# Patient Record
Sex: MALE | Race: BLACK OR AFRICAN AMERICAN | Employment: OTHER | URBAN - METROPOLITAN AREA
[De-identification: names, ages, dates, MRNs, and addresses within clinical notes are randomized per-mention and may not be internally consistent; named-entity substitution may affect disease eponyms.]

---

## 2017-06-02 ENCOUNTER — HOSPITAL ENCOUNTER (OUTPATIENT)
Dept: ULTRASOUND IMAGING | Age: 82
Discharge: HOME | End: 2017-06-02
Attending: INTERNAL MEDICINE
Payer: MEDICARE

## 2017-06-02 DIAGNOSIS — R19.7 DIARRHEA: ICD-10-CM

## 2017-06-02 DIAGNOSIS — R93.3 ABNORMAL FINDING ON GI TRACT IMAGING: ICD-10-CM

## 2017-06-02 DIAGNOSIS — R11.2 NAUSEA WITH VOMITING: ICD-10-CM

## 2017-06-02 DIAGNOSIS — R63.4 ABNORMAL WEIGHT LOSS: ICD-10-CM

## 2017-06-02 PROCEDURE — 76700 US EXAM ABDOM COMPLETE: CPT

## 2018-01-20 ENCOUNTER — APPOINTMENT (OUTPATIENT)
Dept: CT IMAGING | Age: 83
End: 2018-01-20
Attending: PHYSICIAN ASSISTANT
Payer: MEDICARE

## 2018-01-20 ENCOUNTER — HOSPITAL ENCOUNTER (EMERGENCY)
Age: 83
Discharge: HOME | End: 2018-01-20
Attending: EMERGENCY MEDICINE
Payer: MEDICARE

## 2018-01-20 VITALS
BODY MASS INDEX: 20.51 KG/M2 | RESPIRATION RATE: 16 BRPM | HEART RATE: 98 BPM | DIASTOLIC BLOOD PRESSURE: 87 MMHG | WEIGHT: 155.42 LBS | TEMPERATURE: 98.4 F | SYSTOLIC BLOOD PRESSURE: 141 MMHG | OXYGEN SATURATION: 99 %

## 2018-01-20 DIAGNOSIS — S01.112A EYEBROW LACERATION, LEFT, INITIAL ENCOUNTER: Primary | ICD-10-CM

## 2018-01-20 DIAGNOSIS — S09.90XA MINOR HEAD INJURY, INITIAL ENCOUNTER: ICD-10-CM

## 2018-01-20 PROCEDURE — 70450 CT HEAD/BRAIN W/O DYE: CPT

## 2018-01-20 PROCEDURE — 99282 EMERGENCY DEPT VISIT SF MDM: CPT

## 2018-01-20 PROCEDURE — 90715 TDAP VACCINE 7 YRS/> IM: CPT | Performed by: PHYSICIAN ASSISTANT

## 2018-01-20 PROCEDURE — 77030031132 HC SUT NYL COVD -A

## 2018-01-20 PROCEDURE — 75810000293 HC SIMP/SUPERF WND  RPR

## 2018-01-20 PROCEDURE — 90471 IMMUNIZATION ADMIN: CPT

## 2018-01-20 PROCEDURE — 74011250636 HC RX REV CODE- 250/636: Performed by: PHYSICIAN ASSISTANT

## 2018-01-20 PROCEDURE — 74011000250 HC RX REV CODE- 250: Performed by: PHYSICIAN ASSISTANT

## 2018-01-20 PROCEDURE — 77030018836 HC SOL IRR NACL ICUM -A

## 2018-01-20 PROCEDURE — 70486 CT MAXILLOFACIAL W/O DYE: CPT

## 2018-01-20 RX ADMIN — TETANUS TOXOID, REDUCED DIPHTHERIA TOXOID AND ACELLULAR PERTUSSIS VACCINE, ADSORBED 0.5 ML: 5; 2.5; 8; 8; 2.5 SUSPENSION INTRAMUSCULAR at 17:56

## 2018-01-20 RX ADMIN — LIDOCAINE HYDROCHLORIDE 50 MG: 10; .005 INJECTION, SOLUTION EPIDURAL; INFILTRATION; INTRACAUDAL; PERINEURAL at 17:57
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
## 2018-01-20 NOTE — DISCHARGE INSTRUCTIONS
Thank you for allowing us to provide you with care today. We hope we addressed all of your concerns and needs. We strive to provide excellent quality care in the Emergency Department. Please rate us as excellent, as anything less than excellent does not meet our expectations. If you feel that you have not received excellent quality care or timely care, please ask to speak to the nurse manager. Please choose us in the future for your continued health care needs. The exam and treatment you received in the Emergency Department were for an urgent problem and are not intended as complete care. It is important that you follow-up with a doctor, nurse practitioner, or  [id number] assistant to: (1) confirm your diagnosis, (2) re-evaluation of changes in your illness and treatment, and (3) for ongoing care. If your symptoms become worse or you do not improve as expected and you are unable to reach your usual health care provider, you should return to the Emergency Department. We are available 24 hours a day. Take this sheet with you when you go to your follow-up visit. If you have any problem arranging the follow-up visit, contact the Emergency Department immediately. Make an appointment with your Primary Care doctor for follow up of this visit. Return to the ER if you are unable to be seen in the time recommended on your discharge instructions.
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
## 2018-01-20 NOTE — ED NOTES
JOHN Landrum has reviewed discharge instructions with the patient. The patient verbalized understanding. Pt discharged with written instructions. No further concerns at this time. Pt given prescriptions and ambulatory to exit with steady gait.

## 2018-01-26 ENCOUNTER — OFFICE VISIT (OUTPATIENT)
Dept: FAMILY MEDICINE CLINIC | Age: 83
End: 2018-01-26

## 2018-01-26 VITALS
WEIGHT: 155.1 LBS | HEART RATE: 65 BPM | HEIGHT: 73 IN | TEMPERATURE: 99.3 F | DIASTOLIC BLOOD PRESSURE: 75 MMHG | BODY MASS INDEX: 20.56 KG/M2 | SYSTOLIC BLOOD PRESSURE: 133 MMHG | OXYGEN SATURATION: 98 % | RESPIRATION RATE: 18 BRPM

## 2018-01-26 DIAGNOSIS — Z48.02 VISIT FOR SUTURE REMOVAL: Primary | ICD-10-CM

## 2018-01-26 NOTE — PROGRESS NOTES
Chief Complaint   Patient presents with   [facility] Other     remove 3  sutures        Danica Kaitlyn  Identified pt with two pt identifiers(name and ). Chief Complaint   Patient presents with   [facility].    Other     remove 3  sutures        1. Have you been to the ER, urgent care clinic since your last visit? Hospitalized since your last visit? NO    2. Have you seen or consulted any other health care providers outside of the [address] [place] [address] since your last visit? Include any pap smears or colon screening. NO    Today's provider has been notified of reason for visit, vitals and flowsheets obtained on patients. Patient received paperwork for advance directive during previous visit but has not completed at this time     Reviewed record In preparation for visit, huddled with provider and have obtained necessary documentation      Health Maintenance Due   Topic    ZOSTER VACCINE AGE 60>     GLAUCOMA SCREENING Q2Y     Pneumococcal 65+ Low/Medium Risk (1 of 2 - PCV13)    MEDICARE YEARLY EXAM     Influenza Age 5 to Adult        Wt Readings from Last 3 Encounters:   18 155 lb 1.6 oz (70.4 kg)   18 155 lb 6.8 oz (70.5 kg)   16 147 lb 14.9 oz (67.1 kg)     Temp Readings from Last 3 Encounters:   18 99.3 °F (37.4 °C) (Oral)   18 98.4 °F (36.9 °C)   16 97.9 °F (36.6 °C)     BP Readings from Last 3 Encounters:   18 133/75   18 141/87   16 116/77     Pulse Readings from Last 3 Encounters:   18 65   18 98   16 (!) 102     Vitals:    18 1531   BP: 133/75   Pulse: 65   Resp: 18   Temp: 99.3 °F (37.4 °C)   TempSrc: Oral   SpO2: 98%   Weight: 155 lb 1.6 oz (70.4 kg)   Height: 6' 1\" (1.854 m)   PainSc:   0 - No pain         Learning Assessment:  :     No flowsheet data found.     Depression Screening:  :     PHQ over the last two weeks 2018   Little interest or pleasure in doing things Not at all   Feeling down, depressed or hopeless Not at all   Total Score PHQ 2 0       Fall Risk Assessment:  :     No flowsheet data found. Abuse Screening:  :     No flowsheet data found. ADL Screening:  :     ADL Assessment 1/26/2018   Feeding yourself No Help Needed   Getting from bed to chair No Help Needed   Getting dressed No Help Needed   Bathing or showering No Help Needed   Walk across the room (includes cane/walker) No Help Needed   Using the telphone No Help Needed   Taking your medications No Help Needed   Preparing meals No Help Needed   Managing money (expenses/bills) No Help Needed   Moderately strenuous housework (laundry) Help Needed   Shopping for personal items (toiletries/medicines) Help Needed   Shopping for groceries Help Needed   Driving Help Needed   Climbing a flight of stairs No Help Needed   Getting to places beyond walking distances Help Needed                 Medication reconciliation up to date and corrected with patient at this time.

## 2018-01-26 NOTE — MR AVS SNAPSHOT
Romana Halo 
 
 
 [address] [facility] 
[address] [address] 
[phone] Patient: Mary Beth Nesbitt MRN: [id number] :1935 Visit Information Date & Time Provider Department Dept. Phone Encounter #  
 2018  3:20 PM Anibal Goldman NP [facility] [phone] [id number] Upcoming Health Maintenance Date Due ZOSTER VACCINE AGE 60> 1996 GLAUCOMA SCREENING Q2Y 2001 Pneumococcal 65+ Low/Medium Risk (1 of 2 - PCV13) 2001 MEDICARE YEARLY EXAM 2001 Influenza Age 5 to Adult 2017 DTaP/Tdap/Td series (2 - Td) 2028 Allergies as of 2018  Review Complete On: 2018 By: Lan Heath LPN No Known Allergies Current Immunizations  Reviewed on 2018 Name Date Influenza Vaccine (Quad) PF 2016  8:24 AM  
 Tdap 2018  5:56 PM  
  
 Not reviewed this visit Vitals BP Pulse Temp Resp Height(growth percentile) Weight(growth percentile) 133/75 (BP 1 Location: Left arm, BP Patient Position: Sitting) 65 99.3 °F (37.4 °C) (Oral) 18 6' 1\" (1.854 m) 155 lb 1.6 oz (70.4 kg) SpO2 BMI Smoking Status 98% 20.46 kg/m2 Never Smoker BMI and BSA Data Body Mass Index Body Surface Area  
 20.46 kg/m 2 1.9 m 2 Preferred Pharmacy Pharmacy Name Phone [facility] [address] [phone] Your Updated Medication List  
  
   
This list is accurate as of: 18  3:53 PM.  Always use your most recent med list.  
  
  
  
  
 atropine-PHENobarbital-scopolamine-hyoscyamine 16.2-0.1037 -0.0194 mg per tablet Commonly known as:  DONNATAL Take 1 Tab by mouth every six (6) hours as needed for Pain. Max Daily Amount: 4 Tabs. famotidine 20 mg tablet Commonly known as:  PEPCID Take 1 Tab by mouth two (2) times a day. ondansetron hcl 4 mg tablet Commonly known as:  ZOFRAN (AS HYDROCHLORIDE) Take 1 Tab by mouth every eight (8) hours as needed for Nausea. promethazine 25 mg tablet Commonly known as:  PHENERGAN Take 1 Tab by mouth every eight (8) hours as needed for Nausea. Indications: Patient can;t afford zofran  
  
 tamsulosin 0.4 mg capsule Commonly known as:  FLOMAX Take 1 Cap by mouth daily. Patient Instructions 1) Wound looks good. Stitches have been removed Please protect the area by applying vaseline to the area and cover it. If the area is red, inflamed or warm to touch, after 3-5 days, please call the office. Please [address]!!!! Introducing [facility]! Mireya Mcclelland introduces [employer] patient portal. Now you can access parts of your medical record, email your doctor's office, and request medication refills online. 1. In your internet browser, go to [email or web address] 2. Click on the First Time User? Click Here link in the Sign In box. You will see the New Member Sign Up page. 3. Enter your [employer] Access Code exactly as it appears below. You will not need to use this code after youve completed the sign-up process. If you do not sign up before the expiration date, you must request a new code. · [employer] Access Code: [email or web address] Expires: 4/20/2018  5:43 PM 
 
4. Enter the last four digits of your Social Security Number (xxxx) and Date of Birth (mm/dd/yyyy) as indicated and click Submit. You will be taken to the next sign-up page. 5. Create a [employer] ID. This will be your [employer] login ID and cannot be changed, so think of one that is secure and easy to remember. 6. Create a [employer] password. You can change your password at any time. 7. Enter your Password Reset Question and Answer. This can be used at a later time if you forget your password. 8. Enter your e-mail address.  You will receive e-mail notification when new information is available in [employer]. 9. Click Sign Up. You can now view and download portions of your medical record. 10. Click the Download Summary menu link to download a portable copy of your medical information. If you have questions, please visit the Frequently Asked Questions section of the [employer] website. Remember, [employer] is NOT to be used for urgent needs. For medical emergencies, dial 911. Now available from your iPhone and Android! Please provide this summary of care documentation to your next provider. Your primary care clinician is listed as Stephania Dhaliwal. If you have any questions after today's visit, please call [phone].

## 2018-01-26 NOTE — PATIENT INSTRUCTIONS
1) Wound looks good. Stitches have been removed  Please protect the area by applying vaseline to the area and cover it. If the area is red, inflamed or warm to touch, after 3-5 days, please call the office.       Please [address]!!!!
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
## 2018-01-30 ENCOUNTER — OFFICE VISIT (OUTPATIENT)
Dept: FAMILY MEDICINE CLINIC | Age: 83
End: 2018-01-30

## 2018-01-30 VITALS
BODY MASS INDEX: 20.45 KG/M2 | RESPIRATION RATE: 19 BRPM | SYSTOLIC BLOOD PRESSURE: 142 MMHG | OXYGEN SATURATION: 97 % | DIASTOLIC BLOOD PRESSURE: 80 MMHG | HEIGHT: 73 IN | HEART RATE: 84 BPM | TEMPERATURE: 98.6 F | WEIGHT: 154.3 LBS

## 2018-01-30 DIAGNOSIS — Z86.2 HISTORY OF ANEMIA: ICD-10-CM

## 2018-01-30 DIAGNOSIS — Z00.00 LABORATORY EXAM ORDERED AS PART OF ROUTINE GENERAL MEDICAL EXAMINATION: ICD-10-CM

## 2018-01-30 DIAGNOSIS — R73.9 BLOOD GLUCOSE ELEVATED: ICD-10-CM

## 2018-01-30 DIAGNOSIS — Z87.19 HISTORY OF PANCREATITIS: ICD-10-CM

## 2018-01-30 DIAGNOSIS — Z00.00 MEDICARE ANNUAL WELLNESS VISIT, INITIAL: ICD-10-CM

## 2018-01-30 DIAGNOSIS — R68.89 FORGETFULNESS: ICD-10-CM

## 2018-01-30 DIAGNOSIS — Z82.49 FAMILY HISTORY OF HEART DISEASE: ICD-10-CM

## 2018-01-30 DIAGNOSIS — Z00.00 WELL ADULT EXAM: ICD-10-CM

## 2018-01-30 DIAGNOSIS — R03.0 ELEVATED BLOOD PRESSURE READING: ICD-10-CM

## 2018-01-30 DIAGNOSIS — I10 ESSENTIAL (PRIMARY) HYPERTENSION: ICD-10-CM

## 2018-01-30 DIAGNOSIS — N40.1 BENIGN PROSTATIC HYPERPLASIA WITH LOWER URINARY TRACT SYMPTOMS, SYMPTOM DETAILS UNSPECIFIED: Primary | ICD-10-CM

## 2018-01-30 NOTE — PATIENT INSTRUCTIONS
1) Labs  The following blood work was ordered today. Once the tests are completed, you will receive a letter, email or phone call with the results. If you have not heard from us within 10-14 days, please call the office for the results. Complete Blood Count (CBC) helps evaluate your overall health, including hemoglobin and red blood cells that carry oxygen, white blood cells that fight infection and platelets that help with clotting. Complete Metabolic Panel (CMP) assesses electrolytes, kidney and liver function.  (A Basic Metabolic Panel (BMP) does not include liver function.)  Electrolytes include sodium, potassium, calcium, and chloride. These are necessary for cell function and an imbalance can cause serious problems. BUN and creatinine are markers of kidney function. ALT and AST are markers of liver function. Hemoglobin A1c is a 3 month average of your blood sugar and used as a marker of your diabetes control. A normal value is less than 5.7%. Total Cholesterol is the total number of cholesterol particles in your blood, which includes triglycerides, HDL and LDL. A small amount of cholesterol is needed for the body's cells, hormones, and metabolism. Goal is less than 200. Triglycerides are the short term fats in your blood which are used for energy. Goal is less than 150. High Density Lipids (HDL) is the \"good\" cholesterol in your blood. HDL helps remove bad cholesterol from your blood. Goal is more than 40. Low Density Lipids (LDL) is the \"bad\" cholesterol in your blood. LDL can stick to your arteries, raising the risk for heart attack and stroke. Goal is less than 150    Urinalysis - this is a test of your urine to check your overall health. It is recommended as a part of a routine check up and screens for a variety of disorders, such as urinary tract infections, kidney disease and diabetes.      Prostate-Specific Antigen (PSA) -  a test detecting PSA, a protein that can be produced by cancerous and noncancerous tissue in the prostate. (The prostate is a gland that sits below the bladder and is responsible for secreting fluid in semen.) Normal range is 0.0 - 4.0ng/mL. 2) Please make an appointment to see neurology to further assess forgetfulness. I have put in a referral for Dr. Jerald Saeed (but you can see whoever you like)  Dr. Clary Urena  Address: [address]  Phone: [phone]    3) Healthy Weight  Body Mass Index is a noninvasive way to screen for weight and body fat. This is a mathematical calculation based on your height and weight. A healthy BMI is between 20% -24.9%. Your BMI is 20 %. There is a relationship between high BMI and various healthy problems, such as osteoarthritis, muscle pain, increased risk of cancer, diabetes, heart disease, stroke, hypertension, high cholesterol, sleep apnea, breathing problems, depression, which is why a healthy BMI is so important. Choose lean meats for protein source which include chicken, pork, and turkey. The recommended serving size for protein is a 2-3 oz serving (the size of your fist), and 1-1.5 oz of carbohydrate per meal (about 1 cup). Increase servings of fruits and vegetables. Limit processed carbohydrates, (i.e. most breads, crackers, pasta, chips, rice, breaded or battered food, etc). If you choose to eat carbohydrates, whole wheat, (instead of white) is a healthier option for bread, rice, and crackers. Avoid fried foods. Limit sugar. Do your best to avoid all sweetened beverages, such as alcohol, juice, sodas or sweet teas, drink water, unsweet tea, diet soda, or crystal light as options instead. (Don't drink more than 2 of the 12oz artificially sweetened drinks per day as these can increase hunger and make it more difficult to lose weight. The daily goal for water intake should be at least 64 oz/day for most people.      Daily exercise will also help with weight loss and overall health. A minimum of 150 minutes a week of moderate exercise is recommended (30 minutes per day). Make exercise a routine part of your day - for example, park in spaces far away from [facility], take stairs instead of elevator, if sitting for long periods, get up from chair and walk every hour. Recruit a friend or relative to exercise with you and keep you on schedule. It is much easier to exercise with a jignesh who will make sure you work out each day! Meal planning smart phone applications like [employer] can help plan healthy meals. Get 7-8 hours of sleep each night. 4) Bike safety - please wear a helmet when bike riding. Also, a flashing light on your bike will let cars see you more clearly in day and night. A rearview mirror mounted on the handle bars of your bike can let you see traffic behind you without turning your head/body and possibly causing a fall. Schedule of Personalized Health Plan    The best way to stay healthy is to live a healthy lifestyle. A healthy lifestyle includes regular exercise, eating a well-balanced diet, keeping a healthy weight and not smoking. Regular physical exams and screening tests are another important way to take care of yourself. Preventive exams provided by health care providers can find health problems early when treatment works best and can keep you from getting certain diseases or illnesses. Preventive services include exams, lab tests, screening tests, shots, and learning information to help you take care of your own health. The CDC recommends pneumonia vaccines for anyone 72 years and older. These vaccines are usually only needed once in a lifetime unless your healthcare provider decides differently. The 2 pneumonia shots available presently are PCV 13 (Prevnar 13) and PPSV23 (Pneumovax 23). Adults 72 years or older who have not previously received PCV13, should receive a dose of PCV13 first, followed 1 year later by a dose of PPSV23.   Please check which pneumonia shot you received in hospital.     All people over 65 should have a yearly flu vaccine. People over 65 are at medium to high risk for Hepatitis B. Hepatitis B is transmitted through body fluids with a common source being sexual activity or IV drug use. Three shots are needed for complete protection. The CDC recommends the herpes zoster (shingles) vaccine for all adults 61 and older, regardless if a prior episode of zoster has been reported. In addition to your physical exam, some screening tests are recommended:    Osteoporosis screening -There are no signs or symptoms of osteoporosis (weakening of bones). You might not know you have the disease until you break a bone. Thats why its so important to get a bone density test to measure your bone strength. Bone mass measurement is taken with a Dexa scan and is recommended every two years after 72years old or if you have certain risk factors, such as personal history of vertebral fracture or chronic steroid medication use. Diabetes Mellitus screening is recommended every year. This is a blood test, called a hemoglobin A1c, which measures the average blood sugar over a 3 month period. Glaucoma is an eye disease caused by high pressure in the eye. An eye exam is recommended every year. Cardiovascular screening tests that check your cholesterol and other blood fat (lipid) levels are recommended every five years or yearly if you are on medications for cardiovascular disease. Colorectal Cancer screening tests help to find pre-cancerous polyps (growths in the colon) so they can be removed before they turn into cancer. Screening tests are recommended starting at age 48 or earlier if you have a certain risk factors, such as a family history of colon cancer. Prostate Cancer screening is currently not recommended by the [facility] Task Force unless there is a history or symptoms of prostate cancer. (Symptoms include urinary problems such as weak stream, problem starting or stopping urination, blood in urine, erectile dysfunction)    Here is a list of your current Health Maintenance items including a date when each one is due next:  Health Maintenance   Topic Date Due    ZOSTER VACCINE AGE 60>  04/27/1996    GLAUCOMA SCREENING Q2Y  06/27/2001    Pneumococcal 65+ Low/Medium Risk (1 of 2 - PCV13) 06/27/2001    Influenza Age 9 to Adult  08/01/2017    MEDICARE YEARLY EXAM  01/31/2019    DTaP/Tdap/Td series (2 - Td) 01/20/2028         Herpes Zoster (Shingles)     Herpes zoster (shingles) is a painful rash caused by the same virus that causes chickenpox. After an episode of chickenpox, the virus retreats to cells of the nervous system, where it can reside quietly for decades. However, later in life, the varicella-zoster virus can become active again. When it reactivates, it causes shingles. Shingles can affect people of all ages. It is particularly common in adults over age 48 years. It is also more common in individuals of all ages with conditions that weaken the immune system. Pain is usually the first sign of shingles. Other symptoms include: fever, chills, headache, numbness, tingling and/or burning pain and a skin rash. The rash can appear anywhere on your body, but most commonly on the torso. It is usually on only 1 side of your body, in a band or beltlike pattern. Treatment:   There is no cure for shingles - it is usually self-limiting. However, anti-viral medications can reduce the spread of the rash, speed healing and decrease the risk of complications. Supportive Treatment:   1)  Tylenol or ibuprofen can be used to reduce pain  2) Colloidal oatmeal bath or wet cool compresses may help with itching  3) Reduce stress - exercise, yoga, healthy diet    Prevention:  The CDC recommends everyone over the age of 61 receive the shingles vaccine.   This is recommended for people who have already had a shingles outbreak as it could prevent future occurrences of the disease. According to the CDC, it is also recommended for people born before [birth year] in the [address] [facility] who have not had varicella (chicken pox) as they are considered immune. [facility] is a vaccine indicated for prevention of herpes zoster (shingles) in adults aged 48 years and older and is the preferred vaccine for preventing shingles and related complications    The Center for Disease Control and Prevention recommended Shingrix for the following:  - healthy adults aged 48 years and older to prevent shingles and related complications  - adults who previously received the current shingles vaccine (Zostavax®) to prevent shingles and related complications    Two doses (0.5 mL each) are needed for full efficacy. The vaccines are administered intramuscularly, with the second one administered 2-6 months after the initial vaccine.

## 2018-01-30 NOTE — ACP (ADVANCE CARE PLANNING)
Patient does not have an [address] [place] [address]. [facility]. I discussed the basics of Advanced Care Planning with patient, including what the document does, purpose of a health care proxy and how to execute ACP. Patient was given \"Your Right to Decide\" brochure, contact information for office nurse navigator to schedule an appointment to discuss ACP.

## 2018-01-30 NOTE — MR AVS SNAPSHOT
[address] [place] [address] 
[address] [place]o [place] [address] 
[phone] Patient: Claudia Lara MRN: [id number] :1935 Visit Information Date & Time Provider Department Dept. Phone Encounter #  
 2018 11:00 AM Kalpana Arias NP [facility] [phone] Upcoming Health Maintenance Date Due ZOSTER VACCINE AGE 60> 1996 GLAUCOMA SCREENING Q2Y 2001 Pneumococcal 65+ Low/Medium Risk (1 of 2 - PCV13) 2001 MEDICARE YEARLY EXAM 2019 DTaP/Tdap/Td series (2 - Td) 2028 Allergies as of 2018  Review Complete On: 2018 By: Vernell Barr LPN No Known Allergies Current Immunizations  Reviewed on 2018 Name Date Influenza Vaccine (Quad) PF 2016  8:24 AM  
 Tdap 2018  5:56 PM  
  
 Not reviewed this visit You Were Diagnosed With   
  
 Codes Comments Benign prostatic hyperplasia with lower urinary tract symptoms, symptom details unspecified    -  Primary ICD-10-CM: N40.1 ICD-9-CM: 600.01 Medicare annual wellness visit, initial     ICD-10-CM: Z00.00 ICD-9-CM: V70.0 Well adult exam     ICD-10-CM: Z00.00 ICD-9-CM: V70.0 Laboratory exam ordered as part of routine general medical examination     ICD-10-CM: Z00.00 ICD-9-CM: V72.62 Family history of heart disease     ICD-10-CM: Z82.49 
ICD-9-CM: V17.49 Forgetfulness     ICD-10-CM: R68.89 ICD-9-CM: 780.99 Blood glucose elevated     ICD-10-CM: R73.9 ICD-9-CM: 790.29 History of anemia     ICD-10-CM: Z86.2 ICD-9-CM: V12.3 History of pancreatitis     ICD-10-CM: Z87.19 ICD-9-CM: V12.79 Essential (primary) hypertension     ICD-10-CM: I10 
ICD-9-CM: 401.9 Elevated blood pressure reading     ICD-10-CM: R03.0 ICD-9-CM: 796.2 Vitals BP Pulse Temp Resp Height(growth percentile) Weight(growth percentile) 142/80 (BP Patient Position: Sitting) 84 98.6 °F (37 °C) (Oral) 19 6' 1\" (1.854 m) 154 lb 4.8 oz (70 kg) SpO2 BMI Smoking Status 97% 20.36 kg/m2 Never Smoker BMI and BSA Data Body Mass Index Body Surface Area  
 20.36 kg/m 2 1.9 m 2 Preferred Pharmacy Pharmacy Name Phone [facility] Chloé Berg [address] [phone] Your Updated Medication List  
  
Notice  As of 1/30/2018  1:04 PM  
 You have not been prescribed any medications. We Performed the Following AMB POC EKG ROUTINE W/ 12 LEADS, INTER & REP [90382 CPT(R)] CBC W/O DIFF [39801 CPT(R)] HEMOGLOBIN A1C WITH EAG [68571 CPT(R)] LIPID PANEL [80630 CPT(R)] METABOLIC PANEL, COMPREHENSIVE [20133 CPT(R)] PSA, DIAGNOSTIC (PROSTATE SPECIFIC AG) [id number] CPT(R)] REFERRAL TO NEUROLOGY [EUX62 Custom] Comments:  
 Please evaluate and treat for possible dementia  
 UA/M W/RFLX CULTURE, ROUTINE [[id number] Custom] Referral Information Referral ID Referred By Referred To [id number] Vallerie Spurling, MD   
   [address] [facility] [address] [facility], [address] [place] [address] Phone: [phone] Fax: [phone] Visits Status Start Date End Date 1 New Request 1/30/18 1/30/19 If your referral has a status of pending review or denied, additional information will be sent to support the outcome of this decision. Patient Instructions 1) Labs The following blood work was ordered today. Once the tests are completed, you will receive a letter, email or phone call with the results. If you have not heard from us within 10-14 days, please call the office for the results. Complete Blood Count (CBC) helps evaluate your overall health, including hemoglobin and red blood cells that carry oxygen, white blood cells that fight infection and platelets that help with clotting. Complete Metabolic Panel (CMP) assesses electrolytes, kidney and liver function.  (A Basic Metabolic Panel (BMP) does not include liver function.) Electrolytes include sodium, potassium, calcium, and chloride. These are necessary for cell function and an imbalance can cause serious problems. BUN and creatinine are markers of kidney function. ALT and AST are markers of liver function. Hemoglobin A1c is a 3 month average of your blood sugar and used as a marker of your diabetes control. A normal value is less than 5.7%. Total Cholesterol is the total number of cholesterol particles in your blood, which includes triglycerides, HDL and LDL. A small amount of cholesterol is needed for the body's cells, hormones, and metabolism. Goal is less than 200. Triglycerides are the short term fats in your blood which are used for energy. Goal is less than 150. High Density Lipids (HDL) is the \"good\" cholesterol in your blood. HDL helps remove bad cholesterol from your blood. Goal is more than 40. Low Density Lipids (LDL) is the \"bad\" cholesterol in your blood. LDL can stick to your arteries, raising the risk for heart attack and stroke. Goal is less than 150 Urinalysis - this is a test of your urine to check your overall health. It is recommended as a part of a routine check up and screens for a variety of disorders, such as urinary tract infections, kidney disease and diabetes. Prostate-Specific Antigen (PSA) -  a test detecting PSA, a protein that can be produced by cancerous and noncancerous tissue in the prostate. (The prostate is a gland that sits below the bladder and is responsible for secreting fluid in semen.) Normal range is 0.0 - 4.0ng/mL. 2) Please make an appointment to see neurology to further assess forgetfulness. I have put in a referral for Dr. Argenis Jackson (but you can see whoever you like) Dr. Jaja Morales Address: [place] [address], [facility], [address] Phone: [phone] 3) Healthy Weight Body Mass Index is a noninvasive way to screen for weight and body fat. This is a mathematical calculation based on your height and weight. A healthy BMI is between 20% -24.9%. Your BMI is 20 %. There is a relationship between high BMI and various healthy problems, such as osteoarthritis, muscle pain, increased risk of cancer, diabetes, heart disease, stroke, hypertension, high cholesterol, sleep apnea, breathing problems, depression, which is why a healthy BMI is so important. Choose lean meats for protein source which include chicken, pork, and turkey. The recommended serving size for protein is a 2-3 oz serving (the size of your fist), and 1-1.5 oz of carbohydrate per meal (about 1 cup). Increase servings of fruits and vegetables. Limit processed carbohydrates, (i.e. most breads, crackers, pasta, chips, rice, breaded or battered food, etc). If you choose to eat carbohydrates, whole wheat, (instead of white) is a healthier option for bread, rice, and crackers. Avoid fried foods. Limit sugar. Do your best to avoid all sweetened beverages, such as alcohol, juice, sodas or sweet teas, drink water, unsweet tea, diet soda, or crystal light as options instead. (Don't drink more than 2 of the 12oz artificially sweetened drinks per day as these can increase hunger and make it more difficult to lose weight. The daily goal for water intake should be at least 64 oz/day for most people. Daily exercise will also help with weight loss and overall health. A minimum of 150 minutes a week of moderate exercise is recommended (30 minutes per day). Make exercise a routine part of your day - for example, park in spaces far away from [facility], take stairs instead of elevator, if sitting for long periods, get up from chair and walk every hour.    
Recruit a friend or relative to exercise with you and keep you on schedule. It is much easier to exercise with a jignesh who will make sure you work out each day! Meal planning smart phone applications like Stalkthis can help plan healthy meals. Get 7-8 hours of sleep each night. 4) Bike safety - please wear a helmet when bike riding. Also, a flashing light on your bike will let cars see you more clearly in day and night. A rearview mirror mounted on the handle bars of your bike can let you see traffic behind you without turning your head/body and possibly causing a fall. Schedule of Personalized Health Plan The best way to stay healthy is to live a healthy lifestyle. A healthy lifestyle includes regular exercise, eating a well-balanced diet, keeping a healthy weight and not smoking. Regular physical exams and screening tests are another important way to take care of yourself. Preventive exams provided by health care providers can find health problems early when treatment works best and can keep you from getting certain diseases or illnesses. Preventive services include exams, lab tests, screening tests, shots, and learning information to help you take care of your own health. The CDC recommends pneumonia vaccines for anyone 72 years and older. These vaccines are usually only needed once in a lifetime unless your healthcare provider decides differently. The 2 pneumonia shots available presently are PCV 13 (Prevnar 13) and PPSV23 (Pneumovax 23). Adults 72 years or older who have not previously received PCV13, should receive a dose of PCV13 first, followed 1 year later by a dose of PPSV23. Please check which pneumonia shot you received in hospital.  
 
All people over 65 should have a yearly flu vaccine. People over 65 are at medium to high risk for Hepatitis B. Hepatitis B is transmitted through body fluids with a common source being sexual activity or IV drug use. Three shots are needed for complete protection. The CDC recommends the herpes zoster (shingles) vaccine for all adults 61 and older, regardless if a prior episode of zoster has been reported. In addition to your physical exam, some screening tests are recommended: 
 
Osteoporosis screening -There are no signs or symptoms of osteoporosis (weakening of bones). You might not know you have the disease until you break a bone. Thats why its so important to get a bone density test to measure your bone strength. Bone mass measurement is taken with a Dexa scan and is recommended every two years after 72years old or if you have certain risk factors, such as personal history of vertebral fracture or chronic steroid medication use. Diabetes Mellitus screening is recommended every year. This is a blood test, called a hemoglobin A1c, which measures the average blood sugar over a 3 month period. Glaucoma is an eye disease caused by high pressure in the eye. An eye exam is recommended every year. Cardiovascular screening tests that check your cholesterol and other blood fat (lipid) levels are recommended every five years or yearly if you are on medications for cardiovascular disease. Colorectal Cancer screening tests help to find pre-cancerous polyps (growths in the colon) so they can be removed before they turn into cancer. Screening tests are recommended starting at age 48 or earlier if you have a certain risk factors, such as a family history of colon cancer. Prostate Cancer screening is currently not recommended by the [facility] Task Force unless there is a history or symptoms of prostate cancer. (Symptoms include urinary problems such as weak stream, problem starting or stopping urination, blood in urine, erectile dysfunction) Here is a list of your current Health Maintenance items including a date when each one is due next: 
Health Maintenance Topic Date Due  
 ZOSTER VACCINE AGE 60>  04/27/1996  GLAUCOMA SCREENING Q2Y  06/27/2001  Pneumococcal 65+ Low/Medium Risk (1 of 2 - PCV13) 06/27/2001  Influenza Age 5 to Adult  08/01/2017  MEDICARE YEARLY EXAM  01/31/2019  
 DTaP/Tdap/Td series (2 - Td) 01/20/2028 Herpes Zoster (Shingles) Herpes zoster (shingles) is a painful rash caused by the same virus that causes chickenpox. After an episode of chickenpox, the virus retreats to cells of the nervous system, where it can reside quietly for decades. However, later in life, the varicella-zoster virus can become active again. When it reactivates, it causes shingles. Shingles can affect people of all ages. It is particularly common in adults over age 48 years. It is also more common in individuals of all ages with conditions that weaken the immune system. Pain is usually the first sign of shingles. Other symptoms include: fever, chills, headache, numbness, tingling and/or burning pain and a skin rash. The rash can appear anywhere on your body, but most commonly on the torso. It is usually on only 1 side of your body, in a band or beltlike pattern. Treatment:  
There is no cure for shingles - it is usually self-limiting. However, anti-viral medications can reduce the spread of the rash, speed healing and decrease the risk of complications. Supportive Treatment:  
1)  Tylenol or ibuprofen can be used to reduce pain 2) Colloidal oatmeal bath or wet cool compresses may help with itching 3) Reduce stress - exercise, yoga, healthy diet Prevention: The CDC recommends everyone over the age of 61 receive the shingles vaccine. This is recommended for people who have already had a shingles outbreak as it could prevent future occurrences of the disease. According to the CDC, it is also recommended for people born before [birth year] in the [address] [place] [address] [address] who have not had varicella (chicken pox) as they are considered immune.   
 
Alejandro Oas is a vaccine indicated for prevention of herpes zoster (shingles) in adults aged 48 years and older and is the preferred vaccine for preventing shingles and related complications The Center for Disease Control and Prevention recommended Shingrix for the following: 
- healthy adults aged 48 years and older to prevent shingles and related complications 
- adults who previously received the current shingles vaccine (Zostavax®) to prevent shingles and related complications Two doses (0.5 mL each) are needed for full efficacy. The vaccines are administered intramuscularly, with the second one administered 2-6 months after the initial vaccine. Introducing [facility]! [facility] introduces [employer] patient portal. Now you can access parts of your medical record, email your doctor's office, and request medication refills online. 1. In your internet browser, go to [email or web address] 2. Click on the First Time User? Click Here link in the Sign In box. You will see the New Member Sign Up page. 3. Enter your [employer] Access Code exactly as it appears below. You will not need to use this code after youve completed the sign-up process. If you do not sign up before the expiration date, you must request a new code. · [employer] Access Code: [email or web address] Expires: 4/20/2018  5:43 PM 
 
4. Enter the last four digits of your Social Security Number (xxxx) and Date of Birth (mm/dd/yyyy) as indicated and click Submit. You will be taken to the next sign-up page. 5. Create a Sonar[employer]met ID. This will be your [employer] login ID and cannot be changed, so think of one that is secure and easy to remember. 6. Create a [employer] password. You can change your password at any time. 7. Enter your Password Reset Question and Answer. This can be used at a later time if you forget your password. 8. Enter your e-mail address. You will receive e-mail notification when new information is available in [address] 9. Click Sign Up. You can now view and download portions of your medical record. 10. Click the Download Summary menu link to download a portable copy of your medical information. If you have questions, please visit the Frequently Asked Questions section of the [facility] website. Remember, [facility] is NOT to be used for urgent needs. For medical emergencies, dial 911. Now available from your iPhone and Android! Please provide this summary of care documentation to your next provider. Your primary care clinician is listed as Henry Blood. If you have any questions after today's visit, please call [phone].

## 2018-01-30 NOTE — PROGRESS NOTES
S: Phong Sidhu is a 81 y.o. male who presents for CPE    Assessment/Plan:             HPI:      Assessment/Plan:                      Date of visit: 1/30/2018       This is an Initial to [employer] Visit. This initial visit is performed after the first 12 months of effective date of Medicare Part B enrollment and patient has not had a Medicare Annual Wellness visit yet. Initial visit is only performed once. I have reviewed the patient's medical history in detail and updated the computerized patient record. Phong Sidhu is a 81 y.o. male   History obtained from: {:472709::\"the patient\"}. Patient lives: ***    History     Patient Active Problem List   Diagnosis Code    ORLIN (acute kidney injury) ([facility] [address] N17.9    Abdominal mass R19.00    BPH (benign prostatic hyperplasia) N40.0     Past Medical History:   Diagnosis Date    Arthritis       Past Surgical History:   Procedure Laterality Date    HX HERNIA REPAIR       No Known Allergies  Current Outpatient Prescriptions   Medication Sig Dispense Refill    ondansetron hcl (ZOFRAN, AS HYDROCHLORIDE,) 4 mg tablet Take 1 Tab by mouth every eight (8) hours as needed for Nausea. 20 Tab 0    famotidine (PEPCID) 20 mg tablet Take 1 Tab by mouth two (2) times a day. 20 Tab 0    atropine-PHENobarbital-scopolamine-hyoscyamine (DONNATAL) 16.2-0.1037 -0.0194 mg per tablet Take 1 Tab by mouth every six (6) hours as needed for Pain. Max Daily Amount: 4 Tabs. 20 Tab 0    promethazine (PHENERGAN) 25 mg tablet Take 1 Tab by mouth every eight (8) hours as needed for Nausea. Indications: Patient can;t afford zofran 12 Tab 0    tamsulosin (FLOMAX) 0.4 mg capsule Take 1 Cap by mouth daily. 27 Cap 12     Family History   Problem Relation Age of Onset    Stroke Mother      Social History   Substance Use Topics    Smoking status: Never Smoker    Smokeless tobacco: Never Used    Alcohol use No       Specialists/Care Team   [employer].  Lacy Krishnan has established care with the following healthcare providers:  Patient Care Team:  Mika Ndiaye NP as PCP - General (Nurse Practitioner)  ***  Demographics   male  81 y.o. Health Risk Assessment     General Health Questions   -During the past 4 weeks: How would you rate your health in general? {Good/Fair/Poor:06316::\"Good\"}  How much have you been bothered by bodily pain? {None/mildly/moderately/severely exam md:25063}  Has your physical and emotional health limited your social activities with family or friends? {yes/ no default no:19426::\"no\"}    Emotional Health Questions   -Do you have a history of depression, anxiety, or emotional problems? {yes/ no default no:19426::\"no\"}  -Over the past 2 weeks, have you felt down, depressed or hopeless? {yes/ no default no:19426::\"no\"}  -Over the past 2 weeks, have you felt little interest or pleasure in doing things? {yes/ no default no:19426::\"no\"}  PHQ over the last two weeks 1/30/2018   Little interest or pleasure in doing things Not at all   Feeling down, depressed or hopeless Not at all   Total Score PHQ 2 0     Hearing Loss   Have you noticed any hearing difficulties? {yes/ no default no:19426::\"no\"}    Health Habits   Please describe your diet habits: ***  Do you get 5 servings of fruits or vegetables daily? {yes/ no default yes:19425::\"yes\"}  Do you exercise regularly? {yes/ no default yes:19425::\"yes\"}    Do you smoke? ***     Quit Date:   PP/D:    Alcohol Risk Factor Screening:   Do you drink alcohol? ***  On any occasion during the past 3 months, have you had more than 3 drinks containing alcohol? Do you average more than 7 drinks per week? Activities of Daily Living and Functional Status   -Do you need help with eating, walking, dressing, bathing, toileting, the phone, transportation, shopping, preparing meals, housework, laundry, or medications:  {yes/ no default no:19426::\"no\"}  -Do you need help managing money?  {yes/ no default no:19426::\"no\"}  -In the past four weeks, was someone available to help you if you needed and wanted help with anything? {yes/ no default yes:19425::\"yes\"}  -Are you confident are you that you can control and manage most of your health problems? {yes/ no default yes:19425::\"yes\"}  -Have you been given information to help you keep track of your medications? {yes/ no default yes:19425::\"yes\"}  -How often do you have trouble taking your medications as prescribed? {Fq:21106}    Fall Risk and Home Safety   Have you fallen 2 or more times in the past year? {yes/ no default no:19426::\"no\"}  Have you been bothered by feeling dizzy when standing up? {Fq:21106}  Does your home have good lighting, grab bars in the bathroom, handrails on the stairs, good lighting and no fall hazards such as throw rugs on floor? {yes/ no default no:19426::\"no\"}  Do you have smoke detectors and check them regularly? {yes/ no default yes:19425::\"yes\"}  Do you have difficulties driving a car? {yes/ no default no:19426::\"no\"}  Do you always fasten your seat belt when you are in a car? {yes/ no default yes:19425::\"yes\"}  Fall Risk Assessment:    Fall Risk Assessment, last 12 mths 1/26/2018   Able to walk? Yes   Fall in past 12 months? Yes   Number of falls in past 12 months 1       Abuse Screen   {Abuse Screen:19752::\"Patient is not abused\"}    Evaluation of Cognitive Function   Mood/affect:  {mood:89472}  Orientation: {ORIENTATION KGADU:08862555}  Appearance: {APPEARANCE:20444::\"age appropriate\"}  Family member/caregiver input: ***  Physical Examination     Vitals:    01/30/18 1105   BP: 142/80   Pulse: 84   Resp: 19   Temp: 98.6 °F (37 °C)   TempSrc: Oral   SpO2: 97%   Weight: 154 lb 4.8 oz (70 kg)   Height: 6' 1\" (1.854 m)     Body mass index is 20.36 kg/(m^2). No exam data present  Was the patient's timed Up & Go test unsteady or longer than 30 seconds?  {yes/ no default no:65307::\"no\"}    Advice/Referrals/Counseling (as indicated)   Education and counseling provided for any problems identified above: ***    Preventive Services     (Preventive care checklist to be included in patient instructions)  Discussed today Done Previously Not Needed       Glaucoma screening      Colorectal cancer screening (colonoscopy)      Osteoporosis Screening (DEXA scan)      Breast cancer Screening (Mammogram)      Cervical cancer Screening (Pap smear)      Prostate cancer Screening      Cardiovascular Screening (Lipid panel)      Diabetes screening test (Hgb A1c)      Abdominal ultrasound for AAA      Low-dose CT for lung cancer screening      Flu vaccine      Hepatitis B vaccine (if at risk)      Pneumococcal 23  Prevnar 13      Tdap vaccine      Shingles vaccine      Screening for Hepatitis C (b 2588-1065)     Discussion of Advance Directive     Patient was offered the opportunity to discuss advance care planning:  {gen no default/yes/free text:630576::\"yes\"}     Does patient have an Advance Directive:  {gen no default/yes/free text:703732::\"yes\"}  If yes, name of Health Care Agent:   Date directive signed by Pt:                   Directive Witnessed:  YES/NO   If no, did you provide information on [facility]? {gen no default/yes/free text:875351::\"yes\"}       Assessment/Plan   Z00.00  {No Diagnosis Found}    No orders of the defined types were placed in this encounter.       Follow-up Disposition: Not on File    Donna Tolbert, LELA        Social History:  Nutrition:    Physical:   Social:  Occupation:   Tobacco:  Drugs:   Alcohol:   Sexually Active:    Review of Systems:  - Constitutional Symptoms: no fevers/chills  - Eyes: no blurry vision or double vision  - Cardiovascular: no chest pain or palpitations  - Respiratory: no cough or shortness of breath  - Gastrointestinal: no dysphagia or abdominal pain  - Musculoskeletal: no joint pains or weakness  - Integumentary: no rashes  - : no nocturia, problems with starting/stopping urine flow  - Neurological: no numbness, tingling, or headaches  - Endocrine: no heat or cold intolerance, no polyuria or polydipsia    PHQ over the last two weeks 1/30/2018   Little interest or pleasure in doing things Not at all   Feeling down, depressed or hopeless Not at all   Total Score PHQ 2 0        I reviewed the following:  Past Medical History:   Diagnosis Date    Arthritis        Current Outpatient Prescriptions   Medication Sig Dispense Refill    ondansetron hcl (ZOFRAN, AS HYDROCHLORIDE,) 4 mg tablet Take 1 Tab by mouth every eight (8) hours as needed for Nausea. 20 Tab 0    famotidine (PEPCID) 20 mg tablet Take 1 Tab by mouth two (2) times a day. 20 Tab 0    atropine-PHENobarbital-scopolamine-hyoscyamine (DONNATAL) 16.2-0.1037 -0.0194 mg per tablet Take 1 Tab by mouth every six (6) hours as needed for Pain. Max Daily Amount: 4 Tabs. 20 Tab 0    promethazine (PHENERGAN) 25 mg tablet Take 1 Tab by mouth every eight (8) hours as needed for Nausea. Indications: Patient can;t afford zofran 12 Tab 0    tamsulosin (FLOMAX) 0.4 mg capsule Take 1 Cap by mouth daily. 30 Cap 12       No Known Allergies     Health Maintenance:  Lipids (>34yo):  A1c >46yo q3y):  Colonoscopy:  Prostate:  · <40: AUA recommend against  · 40-54: Not recommended  · AA, FHx: DISCUSS  · 55-69: DISCUSS  · 70+, in good health: DISCUSS  · 70+, in poor health: recommend against  Eye exam:  ACP:    Tdap:  Flu:  Shingles:  Pneumo 23:  Prevnar 13:    O: VS:   Visit Vitals    /80 (BP Patient Position: Sitting)    Pulse 84    Temp 98.6 °F (37 °C) (Oral)    Resp 19    Ht 6' 1\" (1.854 m)    Wt 154 lb 4.8 oz (70 kg)    SpO2 97%    BMI 20.36 kg/m2       PAIN: No complaints of pain today. GENERAL: Thomas Joe is in no acute distress. Non-toxic. Well nourished. Well developed. Appropriately groomed. HEAD:  Normocephalic. Atraumatic. Non tender sinuses x 4. EYE: PERRLA. EOMs intact. Sclera anicteric without injection. No drainage or discharge. EARS: Hearing intact bilaterally. External ear canals normal without evidence of blood or swelling. Bilateral TM's intact, pearly grey with landmarks visible. No erythema or effusion. NOSE: Patent. Nasal turbinates pink. No polyps noted. No erythema. No discharge. MOUTH: mucous membranes pink and moist. Posterior pharynx normal with cobblestone appearance. No erythema, white exudate or obstruction. NECK: supple. Midline trachea. No carotid bruits noted bilaterally. No thyromegaly noted. RESP: Breath sounds are symmetrical bilaterally. Unlabored without SOB. Speaking in full sentences. Clear to auscultation bilaterally anteriorly and posteriorly. No wheezes. No rales or rhonchi. CV: normal rate. Regular rhythm. S1, S2 audible. No murmur noted. No rubs, clicks or gallops noted. ABDOMEN: Flat without bulges or pulsations. Soft and nondistended. No tenderness on palpation. No masses or organomegaly. No rebound, rigidity or guarding. Bowel sounds normal x 4 quadrants. BACK: No visible deformities or curvature. Full ROM. No pain on palpation of the spinous processes in the cervical, thoracic, lumbar, sacral regions. No CVA tenderness. : Testicles are descended bilaterally. They are firm, non tender, and without masses or lesions. No penile lesions are noted and there is no discharge from the urethra. The scrotum is without induration, erythema, or edema. RECTAL:  Good sphincter tone. No hemorrhoids or fissures noted. No active bleeding. Normal soft brown stool noted in the rectal vault. Negative guaiac test.  Prostate: ***Mild enlargement/no enlargement. Soft, smooth and symmetric without nodules or tenderness. NEURO:  awake, alert and oriented to person, place, and time and event. Cranial nerves II through XII intact. Clear speech. Muscle strength is +5/5 x 4 extremities. Sensation is intact to light touch bilaterally. Steady gait. MUSC:  Intact x 4 extremities. Full ROM x 4 extremities.   No pain with movement. HEME/LYMPH: peripheral pulses palpable 2+ x 4 extremities. No peripheral edema is noted. No cervical adenopathy noted. SKIN: Skin is warm and dry. Turgor is normal. No petechiae, no purpura, no rash. No cyanosis. No mottling, jaundice or pallor. PSYCH: appropriate behavior, dress and thought processes. Good eye contact. Clear and coherent speech. Full affect. Good insight.   ____________________________________________________________________  Patient education was done. Advised on nutrition, physical activity, weight management, tobacco, alcohol and safety. Counseling included discussion of diagnosis, differentials, treatment options, prescribed treatment, warning signs and follow up. Medication risks/benefits and interactions and alternatives discussed with patient.      Patient verbalized understanding and agreed to plan of care. Patient was given an after visit summary which included current diagnoses, medications and vital signs. Follow up as directed.
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
## 2018-01-30 NOTE — PROGRESS NOTES
Chief Complaint   Patient presents with    Physical     Macel Carota  Identified pt with two pt identifiers(name and ). Chief Complaint   Patient presents with    Physical       1. Have you been to the ER, urgent care clinic since your last visit? NO ospitalized since your last visit? NO    2. Have you seen or consulted any other health care providers outside of the [address] [place] [address] since your last visit? Include any pap smears or colon screening. NO    Today's provider has been notified of reason for visit, vitals and flowsheets obtained on patients. Patient received paperwork for advance directive during previous visit but has not completed at this time     Reviewed record In preparation for visit, huddled with provider and have obtained necessary documentation      Health Maintenance Due   Topic    ZOSTER VACCINE AGE 60>     GLAUCOMA SCREENING Q2Y     Pneumococcal 65+ Low/Medium Risk (1 of 2 - PCV13)    MEDICARE YEARLY EXAM     Influenza Age 5 to Adult        Wt Readings from Last 3 Encounters:   18 154 lb 4.8 oz (70 kg)   18 155 lb 1.6 oz (70.4 kg)   18 155 lb 6.8 oz (70.5 kg)     Temp Readings from Last 3 Encounters:   18 98.6 °F (37 °C) (Oral)   18 99.3 °F (37.4 °C) (Oral)   18 98.4 °F (36.9 °C)     BP Readings from Last 3 Encounters:   18 142/80   18 133/75   18 141/87     Pulse Readings from Last 3 Encounters:   18 84   18 65   18 98     Vitals:    18 1105   BP: 142/80   Pulse: 84   Resp: 19   Temp: 98.6 °F (37 °C)   TempSrc: Oral   SpO2: 97%   Weight: 154 lb 4.8 oz (70 kg)   Height: 6' 1\" (1.854 m)   PainSc:   0 - No pain         Learning Assessment:  :     No flowsheet data found.     Depression Screening:  :     PHQ over the last two weeks 2018   Little interest or pleasure in doing things Not at all   Feeling down, depressed or hopeless Not at all   Total Score PHQ 2 0       Fall Risk Assessment:  : Fall Risk Assessment, last 12 mths 1/26/2018   Able to walk? Yes   Fall in past 12 months? Yes   Number of falls in past 12 months 1       Abuse Screening:  :     No flowsheet data found. ADL Screening:  :     ADL Assessment 1/26/2018   Feeding yourself No Help Needed   Getting from bed to chair No Help Needed   Getting dressed No Help Needed   Bathing or showering No Help Needed   Walk across the room (includes cane/walker) No Help Needed   Using the telphone No Help Needed   Taking your medications No Help Needed   Preparing meals No Help Needed   Managing money (expenses/bills) No Help Needed   Moderately strenuous housework (laundry) Help Needed   Shopping for personal items (toiletries/medicines) Help Needed   Shopping for groceries Help Needed   Driving Help Needed   Climbing a flight of stairs No Help Needed   Getting to places beyond walking distances Help Needed                 Medication reconciliation up to date and corrected with patient at this time.

## 2018-01-31 LAB
ALBUMIN SERPL-MCNC: 4.3 G/DL (ref 3.5–4.7)
ALBUMIN/GLOB SERPL: 1.8 {RATIO} (ref 1.2–2.2)
ALP SERPL-CCNC: 119 IU/L (ref 39–117)
ALT SERPL-CCNC: 12 IU/L (ref 0–44)
APPEARANCE UR: CLEAR
AST SERPL-CCNC: 17 IU/L (ref 0–40)
BACTERIA #/AREA URNS HPF: NORMAL /[HPF]
BILIRUB SERPL-MCNC: 0.9 MG/DL (ref 0–1.2)
BILIRUB UR QL STRIP: NEGATIVE
BUN SERPL-MCNC: 6 MG/DL (ref 8–27)
BUN/CREAT SERPL: 8 (ref 10–24)
CALCIUM SERPL-MCNC: 9.1 MG/DL (ref 8.6–10.2)
CASTS URNS QL MICRO: NORMAL /LPF
CHLORIDE SERPL-SCNC: 101 MMOL/L (ref 96–106)
CHOLEST SERPL-MCNC: 170 MG/DL (ref 100–199)
CO2 SERPL-SCNC: 28 MMOL/L (ref 18–29)
COLOR UR: YELLOW
CREAT SERPL-MCNC: 0.78 MG/DL (ref 0.76–1.27)
EPI CELLS #/AREA URNS HPF: NORMAL /HPF
ERYTHROCYTE [DISTWIDTH] IN BLOOD BY AUTOMATED COUNT: 12.7 % (ref 12.3–15.4)
EST. AVERAGE GLUCOSE BLD GHB EST-MCNC: 97 MG/DL
GFR SERPLBLD CREATININE-BSD FMLA CKD-EPI: 85 ML/MIN/1.73
GFR SERPLBLD CREATININE-BSD FMLA CKD-EPI: 98 ML/MIN/1.73
GLOBULIN SER CALC-MCNC: 2.4 G/DL (ref 1.5–4.5)
GLUCOSE SERPL-MCNC: 91 MG/DL (ref 65–99)
GLUCOSE UR QL: NEGATIVE
HBA1C MFR BLD: 5 % (ref 4.8–5.6)
HCT VFR BLD AUTO: 39.6 % (ref 37.5–51)
HDLC SERPL-MCNC: 56 MG/DL
HGB BLD-MCNC: 12.9 G/DL (ref 13–17.7)
HGB UR QL STRIP: NEGATIVE
INTERPRETATION, 910389: NORMAL
KETONES UR QL STRIP: ABNORMAL
LDLC SERPL CALC-MCNC: 102 MG/DL (ref 0–99)
LEUKOCYTE ESTERASE UR QL STRIP: NEGATIVE
MCH RBC QN AUTO: 28.8 PG (ref 26.6–33)
MCHC RBC AUTO-ENTMCNC: 32.6 G/DL (ref 31.5–35.7)
MCV RBC AUTO: 88 FL (ref 79–97)
MICRO URNS: ABNORMAL
MICRO URNS: ABNORMAL
MUCOUS THREADS URNS QL MICRO: PRESENT
NITRITE UR QL STRIP: NEGATIVE
PH UR STRIP: 7.5 [PH] (ref 5–7.5)
PLATELET # BLD AUTO: 304 X10E3/UL (ref 150–379)
POTASSIUM SERPL-SCNC: 3.8 MMOL/L (ref 3.5–5.2)
PROT SERPL-MCNC: 6.7 G/DL (ref 6–8.5)
PROT UR QL STRIP: NEGATIVE
PSA SERPL-MCNC: 2.7 NG/ML (ref 0–4)
RBC # BLD AUTO: 4.48 X10E6/UL (ref 4.14–5.8)
RBC #/AREA URNS HPF: NORMAL /HPF
SODIUM SERPL-SCNC: 143 MMOL/L (ref 134–144)
SP GR UR: 1.02 (ref 1–1.03)
TRIGL SERPL-MCNC: 58 MG/DL (ref 0–149)
URINALYSIS REFLEX, 377202: ABNORMAL
UROBILINOGEN UR STRIP-MCNC: 1 MG/DL (ref 0.2–1)
VLDLC SERPL CALC-MCNC: 12 MG/DL (ref 5–40)
WBC # BLD AUTO: 6.9 X10E3/UL (ref 3.4–10.8)
WBC #/AREA URNS HPF: NORMAL /HPF

## 2018-05-25 ENCOUNTER — OFFICE VISIT (OUTPATIENT)
Dept: FAMILY MEDICINE CLINIC | Age: 83
End: 2018-05-25

## 2018-05-25 VITALS
OXYGEN SATURATION: 99 % | HEIGHT: 73 IN | TEMPERATURE: 97 F | HEART RATE: 76 BPM | SYSTOLIC BLOOD PRESSURE: 130 MMHG | BODY MASS INDEX: 20.41 KG/M2 | DIASTOLIC BLOOD PRESSURE: 80 MMHG | WEIGHT: 154 LBS | RESPIRATION RATE: 16 BRPM

## 2018-05-25 DIAGNOSIS — R63.0 DECREASED APPETITE: Primary | ICD-10-CM

## 2018-05-25 DIAGNOSIS — R49.0 HOARSENESS: ICD-10-CM

## 2018-05-25 NOTE — PROGRESS NOTES
S: Miguel Figueroa is a 81 y.o. BLACK OR  male who presents for lack of appetite     Assessment/Plan:    1. Decreased appetite  -pt has maintained 154lb since Jan 2018   -wife concerned he isn't eating, advised to use protein powder in milkshakes, fairlife milk  -rtc for weight check in 2 weeks, will consider megace if weight loss    2.  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 Chris Wei    RTC 2 weeks for weight check       HPI:  Not eating - wife states he won't eat all day long; will drink some Ensure   Always had a vigorous appetite   Likes food seafood, chicken but doesn't have   No problems with digesting or swallowing   No ab pain  No lightheadedness or dizziness   Urinating and BM normal    Voice sounds more hoarse today thanat last visit  Pt refuses to go for endoscopy - had one scheduled in Feb. 2017    Not driving at all - per Dr. Kirti Nagel - rx medication, but not sure what it was;   wife stopped giving it to pt bc of SE of dizzy, lightheadedness  Took license from him and no longer is driving per wife    Social History:  Nutrition:    10-10:30a - [facility] chicken and coleslaw  12-1p - liquid energy drinks, Boost or Ensure  6-7p - may eat 1 piece ofmeat and 1 ear of corn; will eat [place] food, ice cream  Physical: hasn't been riding his bike, but plans to   Social: lives with wife who accompanies him to clinic today    Review of Systems:  - Constitutional Symptoms: no fevers/chills  - Cardiovascular: no chest pain or palpitations  - Respiratory: no cough or shortness of breath    PHQ over the last two weeks 5/25/2018   Little interest or pleasure in doing things Not at all   Feeling down, depressed or hopeless Not at all   Total Score PHQ 2 0        I reviewed the following:  Past Medical History:   Diagnosis Date    Arthritis        No Known Allergies      O: VS:   Visit Vitals    /80 (BP 1 Location: Right arm, BP Patient Position: Sitting)    Pulse 76    Temp 97 °F (36.1 °C) (Oral)    Resp 16    Ht 6' 1\" (1.854 m)    Wt 154 lb (69.9 kg)    SpO2 99%    BMI 20.32 kg/m2       Data Reviewed:    Meri Werner has maintained 154lb since Jan 2018   Labs: A1C:      Lab Results   Component Value Date/Time    Hemoglobin A1c 5.0 01/30/2018 03:38 PM     Lipids:  Lab Results   Component Value Date/Time    Cholesterol, total 170 01/30/2018 03:38 PM    HDL Cholesterol 56 01/30/2018 03:38 PM    LDL, calculated 102 (H) 01/30/2018 03:38 PM    VLDL, calculated 12 01/30/2018 03:38 PM    Triglyceride 58 01/30/2018 03:38 PM       PAIN: No complaints of pain today. GENERAL: Meri Werner is in no acute distress. Non-toxic. Well nourished. Well developed. Appropriately groomed. HEAD:  Normocephalic. Atraumatic. Non tender sinuses x 4. EYE: PERRLA. EOMs intact. Sclera anicteric without injection. No drainage or discharge. MOUTH: mucous membranes pink and moist. Posterior pharynx normal with cobblestone appearance. No erythema, white exudate or obstruction. NECK: supple. Midline trachea. No carotid bruits noted bilaterally. No thyromegaly noted. No cervical adenopathy noted. RESP: Breath sounds are symmetrical bilaterally. Unlabored without SOB. Speaking in full sentences. Clear to auscultation bilaterally anteriorly and posteriorly. No wheezes. No rales or rhonchi. CV: normal rate. Regular rhythm. S1, S2 audible. No murmur noted. No rubs, clicks or gallops noted. ABDOMEN: Flat without bulges or pulsations. Soft and nondistended. No tenderness on palpation. No masses or organomegaly. No rebound, rigidity or guarding. Bowel sounds normal x 4 quadrants. HEME/LYMPH: peripheral pulses palpable 2+ x 4 extremities. No peripheral edema is noted. SKIN: Skin is warm and dry. Turgor is normal. No petechiae, no purpura, no rash. No cyanosis.  No mottling, jaundice or pallor. PSYCH: appropriate behavior, dress and thought processes. Good eye contact. Clear and coherent speech. Full affect. Good insight.   ____________________________________________________________________  Patient education was done. Advised on nutrition, physical activity, weight management, tobacco, alcohol and safety. Counseling included discussion of diagnosis, differentials, treatment options, prescribed treatment, warning signs and follow up. Medication risks/benefits and interactions and alternatives discussed with patient.      Patient verbalized understanding and agreed to plan of care. Patient was given an after visit summary which included current diagnoses, medications and vital signs.     Follow up in 2 weeks for weight check

## 2018-05-25 NOTE — PROGRESS NOTES
Guero Cruz  Identified pt with two pt identifiers(name and ). Chief Complaint   Patient presents with    Decreased Appetite     rm 10/. Have you been to the ER, urgent care clinic since your last visit? Hospitalized since your last visit? NO    2. Have you seen or consulted any other health care providers outside of the [facility] since your last visit? Include any pap smears or colon screening. NO      Provider notified of reason for visit, vitals and flowsheets obtained on patients. Patient received paperwork for advance directive during previous visit but has not completed at this time     Reviewed record In preparation for visit, huddled with provider and have obtained necessary documentation      Health Maintenance Due   Topic    ZOSTER VACCINE AGE 60>     GLAUCOMA SCREENING Q2Y     Pneumococcal 65+ Low/Medium Risk (1 of 2 - PCV13)       Wt Readings from Last 3 Encounters:   18 154 lb (69.9 kg)   18 154 lb 4.8 oz (70 kg)   18 155 lb 1.6 oz (70.4 kg)     Temp Readings from Last 3 Encounters:   18 97 °F (36.1 °C) (Oral)   18 98.6 °F (37 °C) (Oral)   18 99.3 °F (37.4 °C) (Oral)     BP Readings from Last 3 Encounters:   18 130/80   18 142/80   18 133/75     Pulse Readings from Last 3 Encounters:   18 76   18 84   18 65     Vitals:    18 0946   BP: 130/80   Pulse: 76   Resp: 16   Temp: 97 °F (36.1 °C)   TempSrc: Oral   SpO2: 99%   Weight: 154 lb (69.9 kg)   Height: 6' 1\" (1.854 m)   PainSc:   0 - No pain         Learning Assessment:  :     No flowsheet data found. Depression Screening:  :     PHQ over the last two weeks 2018   Little interest or pleasure in doing things Not at all   Feeling down, depressed or hopeless Not at all   Total Score PHQ 2 0       Fall Risk Assessment:  :     Fall Risk Assessment, last 12 mths 2018   Able to walk? Yes   Fall in past 12 months?  Yes Number of falls in past 12 months 1       Abuse Screening:  :     No flowsheet data found. ADL Screening:  :     ADL Assessment 1/26/2018   Feeding yourself No Help Needed   Getting from bed to chair No Help Needed   Getting dressed No Help Needed   Bathing or showering No Help Needed   Walk across the room (includes cane/walker) No Help Needed   Using the telphone No Help Needed   Taking your medications No Help Needed   Preparing meals No Help Needed   Managing money (expenses/bills) No Help Needed   Moderately strenuous housework (laundry) Help Needed   Shopping for personal items (toiletries/medicines) Help Needed   Shopping for groceries Help Needed   Driving Help Needed   Climbing a flight of stairs No Help Needed   Getting to places beyond walking distances Help Needed         Medication reconciliation up to date and corrected with patient at this time.

## 2018-05-25 NOTE — PATIENT INSTRUCTIONS
1) Increase intake of food - can make milkshake using protein powders and Fairlife milk. Drink Ensure or Boost on days you aren't eating at least 2 meals daily    Fair Life milk is a healthy choice in milk products. It is double filtered to remove much of the lactose (sugar found in milk) and recommended by trainers and nutritionists. There is 13 g of protein in a serving, so it is an easy way to increase your protein and calcium intake. Return to clinic in 2 weeks for weight check. If you have lost weight, will start medication called Megace to stimulate appetite  Please bring name of GI doctor you saw previously and name of medication Dr. Emilia Bui started you on.

## 2018-05-25 NOTE — MR AVS SNAPSHOT
[address] [facility] [address] 
 
 
 [address] [facility] [address] 
[address] [facility] [address] 
[phone] Patient: Hellen Frederick MRN: [id number] :1935 Visit Information Date & Time Provider Department Dept. Phone Encounter #  
 2018  9:40 AM Aileen Flores NP [facility] [id number] Follow-up Instructions Return in about 2 weeks (around 2018) for weight check. Upcoming Health Maintenance Date Due ZOSTER VACCINE AGE 60> 1996 GLAUCOMA SCREENING Q2Y 2001 Pneumococcal 65+ Low/Medium Risk (1 of 2 - PCV13) 2001 Influenza Age 5 to Adult 2018 MEDICARE YEARLY EXAM 2019 DTaP/Tdap/Td series (2 - Td) 2028 Allergies as of 2018  Review Complete On: 2018 By: Jose Alejandro Perez LPN No Known Allergies Current Immunizations  Reviewed on 2018 Name Date Influenza Vaccine (Quad) PF 2016  8:24 AM  
 Tdap 2018  5:56 PM  
  
 Reviewed by Jose Alejandro Perez LPN on  at  9:46 AM  
You Were Diagnosed With   
  
 Codes Comments Decreased appetite    -  Primary ICD-10-CM: R63.0 ICD-9-CM: 783.0 Hoarseness     ICD-10-CM: R49.0 ICD-9-CM: 784.42 Vitals BP Pulse Temp Resp Height(growth percentile) Weight(growth percentile) 130/80 (BP 1 Location: Right arm, BP Patient Position: Sitting) 76 97 °F (36.1 °C) (Oral) 16 6' 1\" (1.854 m) 154 lb (69.9 kg) SpO2 BMI Smoking Status 99% 20.32 kg/m2 Never Smoker Vitals History BMI and BSA Data Body Mass Index Body Surface Area  
 20.32 kg/m 2 1.9 m 2 Preferred Pharmacy Pharmacy Name Phone [facility] [phone] Your Updated Medication List  
  
Notice  As of 2018 10:16 AM  
 You have not been prescribed any medications. We Performed the Following REFERRAL TO GASTROENTEROLOGY [JIP31 Custom] Comments:  
 Please evaluate and treat patient for increasing hoarseness and decreasing appetite. Thanks. Follow-up Instructions Return in about 2 weeks (around 6/8/2018) for weight check. Referral Information Referral ID Referred By Referred To  
  
 [id number] SUSAN, [address]   
   [address] [address] [place], [address] Visits Status Start Date End Date 1 New Request 5/25/18 5/25/19 If your referral has a status of pending review or denied, additional information will be sent to support the outcome of this decision. Patient Instructions 1) Increase intake of food - can make milkshake using protein powders and Fairlife milk. Drink Ensure or Boost on days you aren't eating at least 2 meals daily Fair Life milk is a healthy choice in milk products. It is double filtered to remove much of the lactose (sugar found in milk) and recommended by trainers and nutritionists. There is 13 g of protein in a serving, so it is an easy way to increase your protein and calcium intake. Return to clinic in 2 weeks for weight check. If you have lost weight, will start medication called Megace to stimulate appetite Please bring name of GI doctor you saw previously and name of medication Dr. Emilia Bui started you on. Introducing [facility]! [address] introduces [employer] patient portal. Now you can access parts of your medical record, email your doctor's office, and request medication refills online. 1. In your internet browser, go to [email or web address] 2. Click on the First Time User? Click Here link in the Sign In box. You will see the New Member Sign Up page. 3. Enter your [employer] Access Code exactly as it appears below. You will not need to use this code after youve completed the sign-up process.  If you do not sign up before the expiration date, you must request a new code. · App[employer]app Access Code: [email or web address] Expires: 8/23/2018 10:16 AM 
 
4. Enter the last four digits of your Social Security Number (xxxx) and Date of Birth (mm/dd/yyyy) as indicated and click Submit. You will be taken to the next sign-up page. 5. Create a App[employer] ID. This will be your App[employer] login ID and cannot be changed, so think of one that is secure and easy to remember. 6. Create a [employer] password. You can change your password at any time. 7. Enter your Password Reset Question and Answer. This can be used at a later time if you forget your password. 8. Enter your e-mail address. You will receive e-mail notification when new information is available in [address] 9. Click Sign Up. You can now view and download portions of your medical record. 10. Click the Download Summary menu link to download a portable copy of your medical information. If you have questions, please visit the Frequently Asked Questions section of the [employer] website. Remember, [employer] is NOT to be used for urgent needs. For medical emergencies, dial 911. Now available from your iPhone and Android! Please provide this summary of care documentation to your next provider. Your primary care clinician is listed as Raad Ring. If you have any questions after today's visit, please call [phone].

## 2018-06-13 ENCOUNTER — OFFICE VISIT (OUTPATIENT)
Dept: FAMILY MEDICINE CLINIC | Age: 83
End: 2018-06-13

## 2018-06-13 VITALS
WEIGHT: 157.2 LBS | HEART RATE: 74 BPM | BODY MASS INDEX: 20.83 KG/M2 | SYSTOLIC BLOOD PRESSURE: 147 MMHG | HEIGHT: 73 IN | TEMPERATURE: 97.1 F | DIASTOLIC BLOOD PRESSURE: 93 MMHG | RESPIRATION RATE: 19 BRPM | OXYGEN SATURATION: 97 %

## 2018-06-13 DIAGNOSIS — R63.0 DECREASED APPETITE: Primary | ICD-10-CM

## 2018-06-13 DIAGNOSIS — R49.0 HOARSENESS: ICD-10-CM

## 2018-06-13 PROBLEM — R63.4 WEIGHT LOSS: Status: ACTIVE | Noted: 2018-06-13

## 2018-06-13 RX ORDER — MEGESTROL ACETATE 20 MG/1
20 TABLET ORAL DAILY
Qty: 30 TAB | Refills: 1 | Status: CANCELLED | OUTPATIENT
Start: 2018-06-13

## 2018-06-13 NOTE — PATIENT INSTRUCTIONS
1) Weight gain of 3 lbs since last visit   Try and make routine of eating in the morning. This can be solid food or a shake or an Ensure/Boost, protein drink. Eat a good lunch or heavy snack and then whatever your wife is cooking for dinner    2) Hoarseness - please consider seeing a ENT specialist so the hoarseness you are experiencing can be evaluated. Learning About Taking Care of Your Voice  What is voice? Your voice is the result of complex actions that involve several body parts. As you breathe out, air gently passes out through your windpipe, across your open vocal cords, through your throat, and out your mouth and nose. When you speak, your vocal cords close partway as air travels through them. This causes them to vibrate and produce the sound of your voice. We use our voices every day-at home and with friends, at work and school, out shopping, and on the phone. Voice is one of the many ways we maintain relationships, express ourselves, and solve problems. And because most of us have always had our voice, it's easy to take it for granted. But you can take steps to take care of your voice. Healthy habits can help you keep a strong voice for a lifetime. How can you take care of your voice? General voice care  Keep your throat moist. Try these ideas:  · Drink plenty of water. If you have kidney, heart, or liver disease and have to limit fluids, talk with your doctor before you increase the amount of fluids you drink. · Use a vaporizer or humidifier to add moisture to your bedroom. Follow the directions for cleaning the machine. · Breathe in steam from a pot of hot water or a facial steamer. Don't smoke. Smoking can make your voice raspy and can increase your risk of throat cancer. If you need help quitting, talk to your doctor about stop-smoking programs and medicines. These can increase your chances of quitting for good.   Avoid other things that can irritate your voice, such as second-hand smoke, dust, and fumes. Speak at a moderate volume, and don't overuse your voice. Here are some tips:  · Try not to talk loudly or shout, such as at sports events or other noisy places. Sometimes it might be helpful to use a microphone. · Don't whisper. It can be hard on your voice. Rest your voice if it gets hoarse or irritated. Use email, send text messages, or write notes instead of talking. Choose a mouthwash that does not have alcohol. And if you take medicines for colds or allergies, choose ones that won't dry out your vocal cords. Ask your doctor which ones would be best for you. Voice care when you have a health problem  If you have laryngitis caused by colds or flu, rest your voice and drink lots of liquids. Treatment is usually not needed. But other medical problems, such as allergies, sinus infections, or acid reflux, can affect your voice. Getting treatment for these problems can help your voice improve. If you have trouble with your voice that lasts for more than a few weeks, see your doctor. You may need medicines, voice therapy, or other treatments to help strengthen your voice. Where can you learn more? Go to http://yue-concha.info/. Enter L602 in the search box to learn more about \"Learning About Taking Care of Your Voice. \"  Current as of: May 12, 2017  Content Version: 11.4  © 3163-3008 Healthwise, Incorporated. Care instructions adapted under license by [employer] (which disclaims liability or warranty for this information). If you have questions about a medical condition or this instruction, always ask your healthcare professional. [address] any warranty or liability for your use of this information.

## 2018-06-13 NOTE — PROGRESS NOTES
Chief Complaint   Patient presents with    Follow-up     appetite     Nancy Wong  Identified pt with two pt identifiers(name and ). Chief Complaint   Patient presents with    Follow-up     appetite       1. Have you been to the ER, urgent care clinic since your last visit? n Hospitalized since your last visit? /No    2. Have you seen or consulted any other health care providers outside of the [address] [place] [address] since your last visit? Include any pap smears or colon screening. Yes/No    Today's provider has been notified of reason for visit, vitals and flowsheets obtained on patients.      Patient received paperwork for advance directive during previous visit but has not completed at this time     Reviewed record In preparation for visit, huddled with provider and have obtained necessary documentation      Health Maintenance Due   Topic    ZOSTER VACCINE AGE 60>     GLAUCOMA SCREENING Q2Y     Pneumococcal 65+ Low/Medium Risk (1 of 2 - PCV13)       Wt Readings from Last 3 Encounters:   18 157 lb 3.2 oz (71.3 kg)   18 154 lb (69.9 kg)   18 154 lb 4.8 oz (70 kg)     Temp Readings from Last 3 Encounters:   18 97.1 °F (36.2 °C) (Oral)   18 97 °F (36.1 °C) (Oral)   18 98.6 °F (37 °C) (Oral)     BP Readings from Last 3 Encounters:   18 (!) 147/93   18 130/80   18 142/80     Pulse Readings from Last 3 Encounters:   18 74   18 76   18 84     Vitals:    18 0935   BP: (!) 147/93   Pulse: 74   Resp: 19   Temp: 97.1 °F (36.2 °C)   TempSrc: Oral   SpO2: 97%   Weight: 157 lb 3.2 oz (71.3 kg)   Height: 6' 1\" (1.854 m)   PainSc:   0 - No pain         Learning Assessment:  :     Learning Assessment 2018   PRIMARY LEARNER Patient   HIGHEST LEVEL OF EDUCATION - PRIMARY LEARNER  DID NOT GRADUATE HIGH SCHOOL   BARRIERS PRIMARY LEARNER NONE   CO-LEARNER CAREGIVER No   PRIMARY LANGUAGE ENGLISH   LEARNER PREFERENCE PRIMARY DEMONSTRATION ANSWERED BY patient   RELATIONSHIP SELF       Depression Screening:  :     PHQ over the last two weeks 6/13/2018   Little interest or pleasure in doing things Not at all   Feeling down, depressed or hopeless Not at all   Total Score PHQ 2 0       Fall Risk Assessment:  :     Fall Risk Assessment, last 12 mths 6/13/2018   Able to walk? Yes   Fall in past 12 months? Yes   Number of falls in past 12 months 1       Abuse Screening:  :     No flowsheet data found. ADL Screening:  :     ADL Assessment 6/13/2018   Feeding yourself No Help Needed   Getting from bed to chair No Help Needed   Getting dressed No Help Needed   Bathing or showering No Help Needed   Walk across the room (includes cane/walker) No Help Needed   Using the telphone No Help Needed   Taking your medications Help Needed   Preparing meals Help Needed   Managing money (expenses/bills) No Help Needed   Moderately strenuous housework (laundry) Help Needed   Shopping for personal items (toiletries/medicines) Help Needed   Shopping for groceries Help Needed   Driving Help Needed   Climbing a flight of stairs No Help Needed   Getting to places beyond walking distances Help Needed                 Medication reconciliation up to date and corrected with patient at this time.

## 2018-06-13 NOTE — PROGRESS NOTES
S: Philly Manley is a 81 y.o. male who presents for follow up weight loss    Assessment/Plan:     1. Decreased appetite  -gained 3 lbs since OV 5/25/18  -reviewed good food choices and ways to increase protein with Ensure, protein shakes and powders    2. Hoarseness  -pt agrees to see Dr. Posada, ENT  - Referral ENT         HPI:  Dr. Scarlet Marques put pt on aricept - but wife took him off d/t lightheadedness SE he was experiencing while he was driving   Discussed medication and SE - pt feels his memory did not improve on med and states he \"has a great memory\" and remembers things form his childhood    Weight gain of 3lbs   Eats \"2 good meals a day\" - states wife is a good cook, he just doesn't have the appetite he used to  Declines megace today  Discussed eating breakfast and making meals he eats count with respect to protein, using Ensure/Boost, protein powder supplements, etc  States he usually drinks a lot, but hasn't recently   BP - 147/93 today; advised good hydration  Denies chest pain, palpations  Denies insomnia, diarrhea, dry skin, no thyroid hx    Carmen Posada at [facility] - wanted to do a endoscopy a few years ago and pt refused  Discussed necessity of going - pt says he will but he states \"I'm not happy about it\"    Social History:  Physical: biking and walking   Social: lives with wife  Tobacco: none  Alcohol: none    Review of Systems:  - Constitutional Symptoms: no fevers, chills, weight loss  - Eyes: no blurry vision or double vision  - Cardiovascular: no chest pain or palpitations  - Respiratory: no cough or shortness of breath  - Gastrointestinal: no dysphagia or abdominal pain  - Musculoskeletal: no joint pains or weakness  - Integumentary: no rashes  - Neurological: no numbness, tingling, or headaches  - Psychiatric: no depression or anxiety  - Endocrine:  no heat or cold intolerance, no polyuria or polydipsia    PHQ over the last two weeks 5/25/2018   Little interest or pleasure in doing things Not at all Feeling down, depressed or hopeless Not at all   Total Score PHQ 2 0       I reviewed the following:  Past Medical History:   Diagnosis Date    Arthritis            No Known Allergies     O: VS:   Visit Vitals    BP (!) 147/93 (BP 1 Location: Left arm, BP Patient Position: Sitting)    Pulse 74    Temp 97.1 °F (36.2 °C) (Oral)    Resp 19    Ht 6' 1\" (1.854 m)    Wt 157 lb 3.2 oz (71.3 kg)    SpO2 97%    BMI 20.74 kg/m2       GENERAL: Marcela Bates is in no acute distress. Non-toxic. Well nourished. Well developed. Appropriately groomed. HEAD:  Normocephalic. Atraumatic. Non tender sinuses x 4. NECK: supple. Midline trachea. No cervical adenopathy noted. No thyromegaly noted. RESP: Breath sounds are symmetrical bilaterally. Unlabored without SOB. Speaking in full sentences. Clear to auscultation bilaterally anteriorly and posteriorly. No wheezes. No rales or rhonchi. CV: normal rate. Regular rhythm. S1, S2 audible. No murmur noted. No rubs, clicks or gallops noted. ABDOMEN: Flat without bulges or pulsations. Soft and nondistended. No tenderness on palpation. No masses or organomegaly. No rebound, rigidity or guarding. Bowel sounds normal x 4 quadrants. HEME/LYMPH: peripheral pulses palpable 2+ x 4 extremities. No peripheral edema is noted. SKIN: Skin is warm and dry. Turgor is normal. No petechiae, no purpura, no rash. No cyanosis. No mottling, jaundice or pallor. PSYCH: appropriate behavior, dress and thought processes. Good eye contact. Clear and coherent speech. Full affect. Good insight.   ___________________________________________________________________  Patient education was done. Advised on nutrition, physical activity, weight management, tobacco, alcohol and safety. Counseling included discussion of diagnosis, differentials, treatment options, prescribed treatment, warning signs and follow up.  Medication risks/benefits, interactions and alternatives discussed with patient.      Patient verbalized understanding and agreed to plan of care. Patient was given an after visit summary which included current diagnoses, medications and vital signs.     Follow up with ENT

## 2018-07-09 ENCOUNTER — OFFICE VISIT (OUTPATIENT)
Dept: FAMILY MEDICINE CLINIC | Age: 83
End: 2018-07-09

## 2018-07-09 VITALS
WEIGHT: 157.5 LBS | RESPIRATION RATE: 19 BRPM | HEART RATE: 78 BPM | BODY MASS INDEX: 20.87 KG/M2 | HEIGHT: 73 IN | DIASTOLIC BLOOD PRESSURE: 82 MMHG | SYSTOLIC BLOOD PRESSURE: 137 MMHG | TEMPERATURE: 98.9 F | OXYGEN SATURATION: 95 %

## 2018-07-09 DIAGNOSIS — Z01.818 PRE-OP EXAM: Primary | ICD-10-CM

## 2018-07-09 DIAGNOSIS — Z23 ENCOUNTER FOR IMMUNIZATION: ICD-10-CM

## 2018-07-09 NOTE — PROGRESS NOTES
S: Malik Espitia is a 81 y.o. male who presents for pre-operative clearance  Patient has paperwork and pre op work up requires EKG, CMP, CBC    Assessment/Plan:  1. Pre-op exam  -labs: CBC, CMP, EKG on file 1/30/18 (per [facility] - EKG ok w/in 6months)  -Procedure: laryngoscopy w/excision of neoplasm  -Based upon the examination performed today, no contraindications have been noted and Malik Espitia  is an acceptable risk for the proposed procedure, pending lab results.      Forms filled out, copy given to pt and paperwork faxed to [phone]     HPI:    Surgical procedure: laryngoscopy w/excision of neoplasm  Anesthesia: unknown  Surgery date: 7/23/18  Surgeon: Dr. Loida Dave [place], [facility]  Phone:, [phone]              No allergy to latex  No allergies to banana, kiwi or avocado  No problems breathing during dental exam  Previous surgeries (hernia repair) - no previous reaction to anesthesia  No obstructive sleep apnea  No metal in body  Anti-coag: none  A1c: 5.0% (1/2018)  This is considered a low to intermediate risk surgery.     Functional capacity is >4 Metabolic Equivalents (METs) without symptoms - METs 6+  EKG on file 1/30/2018 - per [facility] office, EKG acceptable if w/in 6 months    Review of Systems:  - Constitutional Symptoms: no fevers, chills, weight loss  - Cardiovascular: no chest pain or palpitations  - Respiratory: no cough or shortness of breath  - Gastrointestinal: no dysphagia or abdominal pain  - Musculoskeletal: no joint pains or weakness    Social history:   Nutrition: diet   Physical: bike riding   Social: lives with wife   Occupation: retired   Tobacco/Drugs: none  Alcohol: none     I reviewed the following:  Past Medical History:   Diagnosis Date    Arthritis      No Known Allergies    O: VS:   Visit Vitals    /82 (BP 1 Location: Right arm, BP Patient Position: Sitting)    Pulse 78    Temp 98.9 °F (37.2 °C) (Oral)    Resp 19    Ht 6' 1\" (1.854 m)    Wt 157 lb 8 oz (71.4 kg)    SpO2 95%    BMI 20.78 kg/m2       GENERAL: Shivam Laughlin is in no acute distress. Non-toxic. Well nourished. Well developed. Appropriately groomed. HEAD:  Normocephalic. Atraumatic. Non tender sinuses x 4. EYE: PERRLA. EOMs intact. Sclera anicteric without injection. No drainage or discharge. EARS: Hearing intact bilaterally. External ear canals normal without evidence of blood or swelling. Bilateral TM's intact, pearly grey with landmarks visible. No erythema or effusion. NOSE: Patent. Nasal turbinates pink. No erythema. No discharge. MOUTH: mucous membranes pink and moist. Posterior pharynx normal with cobblestone appearance. No erythema, white exudate or obstruction. NECK: supple. Midline trachea. No cervical adenopathy noted. RESP: Breath sounds are symmetrical bilaterally. Unlabored without SOB. Speaking in full sentences. Clear to auscultation bilaterally anteriorly and posteriorly. No wheezes. No rales or rhonchi. CV: normal rate. Regular rhythm. S1, S2 audible. No murmur noted. No rubs, clicks or gallops noted. ABDOMEN: Flat without bulges or pulsations. Soft and nondistended. No tenderness on palpation. No masses or organomegaly. No rebound, rigidity or guarding. Bowel sounds normal x 4 quadrants. BACK: No visible deformities or curvature. Full ROM. No pain on palpation of the spinous processes in the cervical, thoracic, lumbar, sacral regions. No CVA tenderness. NEURO:  awake, alert and oriented to person, place, and time and event. Cranial nerves II through XII intact. Clear speech. Muscle strength is +5/5 x 4 extremities. Sensation is intact to light touch bilaterally. Steady gait. MUSC:  Intact x 4 extremities. Full ROM x 4 extremities. No pain with movement. HEME/LYMPH: peripheral pulses palpable 2+ x 4 extremities. No peripheral edema is noted. No rashes noted. SKIN: clean and dry. Good turgor. PSYCH: appropriate behavior and dress. __________________________________________________________________  Patient education was done. Advised on nutrition, physical activity, weight management, tobacco, alcohol and safety. Patient verbalized understanding and agreed to plan of care. Patient was given an after visit summary which included current diagnoses, medications and vital signs.

## 2018-07-09 NOTE — PROGRESS NOTES
Chief Complaint   Patient presents with    Pre-op Exam     Throat surgery 2018     Irina Moore  Identified pt with two pt identifiers(name and ). Chief Complaint   Patient presents with    Pre-op Exam     Throat surgery 2018       1. Have you been to the ER, urgent care clinic since your last visit?n  Hospitalized since your last visit? No    2. Have you seen or consulted any other health care providers outside of the [address] [place] [address] since your last visit?n  Include any pap smears or colon screening. No    Today's provider has been notified of reason for visit, vitals and flowsheets obtained on patients.        Reviewed record In preparation for visit, huddled with provider and have obtained necessary documentation      Health Maintenance Due   Topic    ZOSTER VACCINE AGE 60>     GLAUCOMA SCREENING Q2Y     Pneumococcal 65+ Low/Medium Risk (1 of 2 - PCV13)       Wt Readings from Last 3 Encounters:   18 157 lb 8 oz (71.4 kg)   18 157 lb 3.2 oz (71.3 kg)   18 154 lb (69.9 kg)     Temp Readings from Last 3 Encounters:   18 98.9 °F (37.2 °C) (Oral)   18 97.1 °F (36.2 °C) (Oral)   18 97 °F (36.1 °C) (Oral)     BP Readings from Last 3 Encounters:   18 137/82   18 (!) 147/93   18 130/80     Pulse Readings from Last 3 Encounters:   18 78   18 74   18 76     Vitals:    18 1516   BP: 137/82   Pulse: 78   Resp: 19   Temp: 98.9 °F (37.2 °C)   TempSrc: Oral   SpO2: 95%   Weight: 157 lb 8 oz (71.4 kg)   Height: 6' 1\" (1.854 m)   PainSc:   0 - No pain         Learning Assessment:  :     Learning Assessment 2018   PRIMARY LEARNER Patient   HIGHEST LEVEL OF EDUCATION - PRIMARY LEARNER  DID NOT GRADUATE HIGH SCHOOL   BARRIERS PRIMARY LEARNER NONE   CO-LEARNER CAREGIVER No   PRIMARY LANGUAGE ENGLISH   LEARNER PREFERENCE PRIMARY DEMONSTRATION   ANSWERED BY patient   RELATIONSHIP SELF       Depression Screening:  :     PHQ over the last two weeks 7/9/2018   Little interest or pleasure in doing things Not at all   Feeling down, depressed or hopeless Not at all   Total Score PHQ 2 0       Fall Risk Assessment:  :     Fall Risk Assessment, last 12 mths 7/9/2018   Able to walk? Yes   Fall in past 12 months? Yes   Number of falls in past 12 months 1       Abuse Screening:  :     Abuse Screening Questionnaire 7/9/2018   Do you ever feel afraid of your partner? N   Are you in a relationship with someone who physically or mentally threatens you? N   Is it safe for you to go home? Y       ADL Screening:  :     ADL Assessment 6/13/2018   Feeding yourself No Help Needed   Getting from bed to chair No Help Needed   Getting dressed No Help Needed   Bathing or showering No Help Needed   Walk across the room (includes cane/walker) No Help Needed   Using the telphone No Help Needed   Taking your medications Help Needed   Preparing meals Help Needed   Managing money (expenses/bills) No Help Needed   Moderately strenuous housework (laundry) Help Needed   Shopping for personal items (toiletries/medicines) Help Needed   Shopping for groceries Help Needed   Driving Help Needed   Climbing a flight of stairs No Help Needed   Getting to places beyond walking distances Help Needed                 Medication reconciliation up to date and corrected with patient at this time. Tamy Holm is a 81 y.o. male who presents for routine immunizations. He denies any symptoms , reactions or allergies that would exclude them from being immunized today. Risks and adverse reactions were discussed and the VIS was given to them. All questions were addressed. He was observed for 15 min post injection. There were no reactions observed.     May Patel LPN

## 2018-07-09 NOTE — PATIENT INSTRUCTIONS
1) Cerumen (ear wax) is a natural lubricating and protective substance secreted in the ear canal. If you have a build up of ear wax, you can use an over the counter (OTC) product called Debrox to help remove the ear wax. Debrox comes in a yellow and green box and can be purchased at most drug stores. It works by releasing oxygen in the ear, allowing the solution to foam and soften and loosen the wax. Place 5-7 drops in the ear canal before going to bed at night for 2-4 nights. You can use a washcloth wrapped around your finger to gently removed ear wax as it exits the ear canal.     Do NOT use Q-tips to clean your ears as this  pushes it further into the ear canal and packs the ear wax. 2) Alzheimer's Association has good resources - go to the website: Radames Burns 1237 chapter:  http://www.AllBusiness.com/    3) Pre-Operative:  - Avoid eating or drinking anything for eight to 12 hours before the procedure, or as directed by surgeon;  - Inform your physician of medications to control diabetes, hypertension, high cholesterol or angina;  - Increase fluid intake a few days before the procedure.  -Please verify that you should take your blood pressure medications with a small sip of water the morning of surgery with your surgeon.  -Ask your surgeon if he/she wants you to discontinue any medication prior to surgery, such as anticoagulants (aspirin, xarelto, etc)   -A scopolamine patch prior to surgery can help with nausea after the surgery. Talk to your surgeon about this medication. Post-Operative:  - Follow instructions from surgeon to help speed recovery  - Good nutrition and sleep with help with healing process    4) Prevnar 13     The CDC recommends pneumonia vaccines for anyone 65 years and older. These vaccines are usually only needed once in a lifetime unless your healthcare provider decides differently.  The 2 pneumonia shots available presently are PCV 13 (Prevnar 13) and PPSV23 (Pneumovax 23). Adults 72 years or older who have not previously received PCV13, should receive a dose of PCV13 first, followed 1 year later by a dose of PPSV23. Vaccine Information Statement     Pneumococcal Conjugate Vaccine (PCV13): What You Need to Know    Many Vaccine Information Statements are available in [place] and other languages. See www.immunize.org/vis. Hojas de información Sobre Vacunas están disponibles en español y en muchos otros idiomas. Visite www.immunize.org/vis. 1. Why get vaccinated? Vaccination can protect both children and adults from pneumococcal disease. Pneumococcal disease is caused by bacteria that can spread from person to person through close contact. It can cause ear infections, and it can also lead to more serious infections of the:   Lungs (pneumonia),   Blood (bacteremia), and   Covering of the brain and spinal cord (meningitis). Pneumococcal pneumonia is most common among adults. Pneumococcal meningitis can cause deafness and brain damage, and it kills about 1 child in 10 who get it. Anyone can get pneumococcal disease, but children under 3years of age and adults 72 years and older, people with certain medical conditions, and cigarette smokers are at the highest risk. Before there was a vaccine, the [facility] saw:   more than 700 cases of meningitis,   about 13,000 blood infections,   about 5 million ear infections, and   about 200 deaths  in children under 5 each year from pneumococcal disease. Since vaccine became available, severe pneumococcal disease in these children has fallen by 88%. About 18,000 older adults die of pneumococcal disease each year in the [place]. Treatment of pneumococcal infections with penicillin and other drugs is not as effective as it used to be, because some strains of the disease have become resistant to these drugs. This makes prevention of the disease, through vaccination, even more important.     2. PCV13 vaccine    Pneumococcal conjugate vaccine (called PCV13) protects against 13 types of pneumococcal bacteria. PCV13 is routinely given to children at 2, 4, 6, and 1515 months of age. It is also recommended for children and adults 3to 59years of age with certain health conditions, and for all adults 72years of age and older. Your doctor can give you details. 3. Some people should not get this vaccine    Anyone who has ever had a life-threatening allergic reaction to a dose of this vaccine, to an earlier pneumococcal vaccine called PCV7, or to any vaccine containing diphtheria toxoid (for example, DTaP), should not get PCV13. Anyone with a severe allergy to any component of PCV13 should not get the vaccine. Tell your doctor if the person being vaccinated has any severe allergies. If the person scheduled for vaccination is not feeling well, your healthcare provider might decide to reschedule the shot on another day. 4. Risks of a vaccine reaction    With any medicine, including vaccines, there is a chance of reactions. These are usually mild and go away on their own, but serious reactions are also possible. Problems reported following PCV13 varied by age and dose in the series. The most common problems reported among children were:    About half became drowsy after the shot, had a temporary loss of appetite, or had redness or tenderness where the shot was given.  About 1 out of 3 had swelling where the shot was given.  About 1 out of 3 had a mild fever, and about 1 in 20 had a fever over 102.2°F.   Up to about 8 out of 10 became fussy or irritable. Adults have reported pain, redness, and swelling where the shot was given; also mild fever, fatigue, headache, chills, or muscle pain. Yordan Vallejo children who get PCV13 along with inactivated flu vaccine at the same time may be at increased risk for seizures caused by fever. Ask your doctor for more information.      Problems that could happen after any vaccine:     People sometimes faint after a medical procedure, including vaccination. Sitting or lying down for about 15 minutes can help prevent fainting, and injuries caused by a fall. Tell your doctor if you feel dizzy, or have vision changes or ringing in the ears.  Some older children and adults get severe pain in the shoulder and have difficulty moving the arm where a shot was given. This happens very rarely.  Any medication can cause a severe allergic reaction. Such reactions from a vaccine are very rare, estimated at about 1 in a million doses, and would happen within a few minutes to a few hours after the vaccination. As with any medicine, there is a very small chance of a vaccine causing a serious injury or death. The safety of vaccines is always being monitored. For more information, visit: www.cdc.gov/vaccinesafety/     5. What if there is a serious reaction? What should I look for?  Look for anything that concerns you, such as signs of a severe allergic reaction, very high fever, or unusual behavior. Signs of a severe allergic reaction can include hives, swelling of the face and throat, difficulty breathing, a fast heartbeat, dizziness, and weakness - usually within a few minutes to a few hours after the vaccination. What should I do?  If you think it is a severe allergic reaction or other emergency that cant wait, call 9-1-1 or get the person to the nearest hospital. Otherwise, call your doctor. Reactions should be reported to the Vaccine Adverse Event Reporting System (VAERS). Your doctor should file this report, or you can do it yourself through the VAERS web site at www.vaers. hhs.gov, or by calling 3-797.472.5841. VAERS does not give medical advice.     6. The National Vaccine Injury Compensation Program    The National Vaccine Injury Compensation Program (VICP) is a federal program that was created to compensate people who may have been injured by certain vaccines. Persons who believe they may have been injured by a vaccine can learn about the program and about filing a claim by calling 1-763.847.6986 or visiting the [address] [employer] website at www.[facility].gov/vaccinecompensation. There is a time limit to file a claim for compensation. 7. How can I learn more?  Ask your healthcare provider. He or she can give you the vaccine package insert or suggest other sources of information.  Call your local or state health department.  Contact the Centers for Disease Control and Prevention (CDC):  - Call 6-498.505.8760 (1-800-CDC-INFO) or  - Visit CDCs website at www.cdc.gov/vaccines    Vaccine Information Statement   PCV13 Vaccine   11/5/2015   42 KIRT Meléndez [id number]    Department of Health and Human Services  Centers for Disease Control and Prevention    Office Use Only

## 2018-07-09 NOTE — MR AVS SNAPSHOT
[address] [facility] [address] 
 
 
 [address] [facility] [address] 
[address] [place] [address] 
[phone] Patient: Abundio Hsu MRN: [id number] :1935 Visit Information Date & Time Provider Department Dept. Phone Encounter #  
 2018  3:20 PM Margaret Pierce NP [facility] [phone] [id number] Follow-up Instructions Return if symptoms worsen or fail to improve. Upcoming Health Maintenance Date Due ZOSTER VACCINE AGE 60> 1996 GLAUCOMA SCREENING Q2Y 2001 Pneumococcal 65+ Low/Medium Risk (1 of 2 - PCV13) 2001 Influenza Age 5 to Adult 2018 MEDICARE YEARLY EXAM 2019 DTaP/Tdap/Td series (2 - Td) 2028 Allergies as of 2018  Review Complete On: 2018 By: Margaret Pierce NP No Known Allergies Current Immunizations  Reviewed on 2018 Name Date Influenza Vaccine (Quad) PF 2016  8:24 AM  
 Pneumococcal Conjugate (PCV-13)  Incomplete Tdap 2018  5:56 PM  
  
 Not reviewed this visit You Were Diagnosed With   
  
 Codes Comments Pre-op exam    -  Primary ICD-10-CM: O00.304 ICD-9-CM: V72.84 Encounter for immunization     ICD-10-CM: M03 ICD-9-CM: V03.89 Vitals BP Pulse Temp Resp Height(growth percentile) Weight(growth percentile) 137/82 (BP 1 Location: Right arm, BP Patient Position: Sitting) 78 98.9 °F (37.2 °C) (Oral) 19 6' 1\" (1.854 m) 157 lb 8 oz (71.4 kg) SpO2 BMI Smoking Status 95% 20.78 kg/m2 Never Smoker BMI and BSA Data Body Mass Index Body Surface Area 20.78 kg/m 2 1.92 m 2 [facility], [address] [place], [place] [address]vd AT [address] [phone] Your Updated Medication List  
  
Notice  As of 2018  4:04 PM  
 You have not been prescribed any medications. We Performed the Following CBC WITH AUTOMATED DIFF [20139 CPT(R)] METABOLIC PANEL, COMPREHENSIVE [33766 CPT(R)] PNEUMOCOCCAL CONJ VACCINE 13 VALENT IM [id number] CPT(R)] PA IMMUNIZ ADMIN,1 SINGLE/COMB VAC/TOXOID [id number] CPT(R)] Follow-up Instructions Return if symptoms worsen or fail to improve. Patient Instructions 1) Cerumen (ear wax) is a natural lubricating and protective substance secreted in the ear canal. If you have a build up of ear wax, you can use an over the counter (OTC) product called Debrox to help remove the ear wax. Debrox comes in a yellow and green box and can be purchased at most drug stores. It works by releasing oxygen in the ear, allowing the solution to foam and soften and loosen the wax. Place 5-7 drops in the ear canal before going to bed at night for 2-4 nights. You can use a washcloth wrapped around your finger to gently removed ear wax as it exits the ear canal.  
 
Do NOT use Q-tips to clean your ears as this  pushes it further into the ear canal and packs the ear wax. 2) Alzheimer's Association has good resources - go to the website: [address] [place] [address] chapter:  http://www.[facility]/ 3) Pre-Operative: - Avoid eating or drinking anything for eight to 12 hours before the procedure, or as directed by surgeon; 
- Inform your physician of medications to control diabetes, hypertension, high cholesterol or angina; 
- Increase fluid intake a few days before the procedure. 
-Please verify that you should take your blood pressure medications with a small sip of water the morning of surgery with your surgeon. 
-Ask your surgeon if he/she wants you to discontinue any medication prior to surgery, such as anticoagulants (aspirin, xarelto, etc)  
-A scopolamine patch prior to surgery can help with nausea after the surgery. Talk to your surgeon about this medication. Post-Operative: - Follow instructions from surgeon to help speed recovery - Good nutrition and sleep with help with healing process 4) Prevnar 13 The CDC recommends pneumonia vaccines for anyone 72 years and older. These vaccines are usually only needed once in a lifetime unless your healthcare provider decides differently. The 2 pneumonia shots available presently are PCV 13 (Prevnar 13) and PPSV23 (Pneumovax 23). Adults 72 years or older who have not previously received PCV13, should receive a dose of PCV13 first, followed 1 year later by a dose of PPSV23. Vaccine Information Statement Pneumococcal Conjugate Vaccine (PCV13): What You Need to Know Many Vaccine Information Statements are available in Citizen of [place] and other languages. See www.immunize.org/vis. Hojas de información Sobre Vacunas están disponibles en español y en muchos otros idiomas. Visite www.immunize.org/vis. 1. Why get vaccinated? Vaccination can protect both children and adults from pneumococcal disease. Pneumococcal disease is caused by bacteria that can spread from person to person through close contact. It can cause ear infections, and it can also lead to more serious infections of the: 
 Lungs (pneumonia),  Blood (bacteremia), and 
 Covering of the brain and spinal cord (meningitis). Pneumococcal pneumonia is most common among adults. Pneumococcal meningitis can cause deafness and brain damage, and it kills about 1 child in 10 who get it. Anyone can get pneumococcal disease, but children under 3years of age and adults 72 years and older, people with certain medical conditions, and cigarette smokers are at the highest risk. Before there was a vaccine, the [facility] saw: 
 more than 700 cases of meningitis, 
 about 13,000 blood infections, 
 about 5 million ear infections, and 
 about 200 deaths 
in children under 5 each year from pneumococcal disease. Since vaccine became available, severe pneumococcal disease in these children has fallen by 88%. About 18,000 older adults die of pneumococcal disease each year in the [place]. Treatment of pneumococcal infections with penicillin and other drugs is not as effective as it used to be, because some strains of the disease have become resistant to these drugs. This makes prevention of the disease, through vaccination, even more important. 2. PCV13 vaccine Pneumococcal conjugate vaccine (called PCV13) protects against 13 types of pneumococcal bacteria. PCV13 is routinely given to children at 2, 4, 6, and 1515 months of age. It is also recommended for children and adults 3to 59years of age with certain health conditions, and for all adults 72years of age and older. Your doctor can give you details. 3. Some people should not get this vaccine Anyone who has ever had a life-threatening allergic reaction to a dose of this vaccine, to an earlier pneumococcal vaccine called PCV7, or to any vaccine containing diphtheria toxoid (for example, DTaP), should not get PCV13. Anyone with a severe allergy to any component of PCV13 should not get the vaccine. Tell your doctor if the person being vaccinated has any severe allergies. If the person scheduled for vaccination is not feeling well, your healthcare provider might decide to reschedule the shot on another day. 4. Risks of a vaccine reaction With any medicine, including vaccines, there is a chance of reactions. These are usually mild and go away on their own, but serious reactions are also possible. Problems reported following PCV13 varied by age and dose in the series. The most common problems reported among children were:  About half became drowsy after the shot, had a temporary loss of appetite, or had redness or tenderness where the shot was given.  About 1 out of 3 had swelling where the shot was given.  About 1 out of 3 had a mild fever, and about 1 in 20 had a fever over 102.2°F.  Up to about 8 out of 10 became fussy or irritable. Adults have reported pain, redness, and swelling where the shot was given; also mild fever, fatigue, headache, chills, or muscle pain. Cristy Yang children who get PCV13 along with inactivated flu vaccine at the same time may be at increased risk for seizures caused by fever. Ask your doctor for more information. Problems that could happen after any vaccine:  People sometimes faint after a medical procedure, including vaccination. Sitting or lying down for about 15 minutes can help prevent fainting, and injuries caused by a fall. Tell your doctor if you feel dizzy, or have vision changes or ringing in the ears.  Some older children and adults get severe pain in the shoulder and have difficulty moving the arm where a shot was given. This happens very rarely.  Any medication can cause a severe allergic reaction. Such reactions from a vaccine are very rare, estimated at about 1 in a million doses, and would happen within a few minutes to a few hours after the vaccination. As with any medicine, there is a very small chance of a vaccine causing a serious injury or death. The safety of vaccines is always being monitored. For more information, visit: www.cdc.gov/vaccinesafety/  
 
5. What if there is a serious reaction? What should I look for?  Look for anything that concerns you, such as signs of a severe allergic reaction, very high fever, or unusual behavior. Signs of a severe allergic reaction can include hives, swelling of the face and throat, difficulty breathing, a fast heartbeat, dizziness, and weakness  usually within a few minutes to a few hours after the vaccination. What should I do?  If you think it is a severe allergic reaction or other emergency that cant wait, call 9-1-1 or get the person to the nearest hospital. Otherwise, call your doctor. Reactions should be reported to the Vaccine Adverse Event Reporting System (VAERS). Your doctor should file this report, or you can do it yourself through the VAERS web site at www.vaers. [facility].gov, or by calling 2-494.807.6471. VAERS does not give medical advice. 6. The National Vaccine Injury Compensation Program 
 
The [facility] Vaccine Injury Compensation Program (VICP) is a federal program that was created to compensate people who may have been injured by certain vaccines. Persons who believe they may have been injured by a vaccine can learn about the program and about filing a claim by calling 9-850.785.9617 or visiting the [facility] website at www.[facility].gov/vaccinecompensation. There is a time limit to file a claim for compensation. 7. How can I learn more?  Ask your healthcare provider. He or she can give you the vaccine package insert or suggest other sources of information.  Call your local or state health department.  Contact the Centers for Disease Control and Prevention (CDC): 
- Call 7-735.151.8261 (1-800-CDC-INFO) or 
- Visit CDCs website at www.cdc.gov/vaccines Vaccine Information Statement PCV13 Vaccine 11/5/2015  
42 U. Keyonna Sample [id number] [facility] and [employer] Centers for Disease Control and Prevention Office Use Only Introducing [facility]! [employer] introduces [employer] patient portal. Now you can access parts of your medical record, email your doctor's office, and request medication refills online. 1. In your internet browser, go to [email or web address] 2. Click on the First Time User? Click Here link in the Sign In box. You will see the New Member Sign Up page. 3. Enter your [employer] Access Code exactly as it appears below. You will not need to use this code after youve completed the sign-up process. If you do not sign up before the expiration date, you must request a new code. · [employer] Access Code: [email or web address] Expires: 8/23/2018 10:16 AM 
 
4. Enter the last four digits of your Social Security Number (xxxx) and Date of Birth (mm/dd/yyyy) as indicated and click Submit. You will be taken to the next sign-up page. 5. Create a [employer] ID. This will be your [employer] login ID and cannot be changed, so think of one that is secure and easy to remember. 6. Create a [employer] password. You can change your password at any time. 7. Enter your Password Reset Question and Answer. This can be used at a later time if you forget your password. 8. Enter your e-mail address. You will receive e-mail notification when new information is available in [address] 9. Click Sign Up. You can now view and download portions of your medical record. 10. Click the Download Summary menu link to download a portable copy of your medical information. If you have questions, please visit the Frequently Asked Questions section of the [employer] website. Remember, [employer] is NOT to be used for urgent needs. For medical emergencies, dial 911. Now available from your iPhone and Android! Please provide this summary of care documentation to your next provider. Your primary care clinician is listed as Amirah Senior. If you have any questions after today's visit, please call [phone].

## 2018-07-10 DIAGNOSIS — R74.8 ELEVATED ALKALINE PHOSPHATASE LEVEL: Primary | ICD-10-CM

## 2018-07-10 LAB
ALBUMIN SERPL-MCNC: 4.4 G/DL (ref 3.5–4.7)
ALBUMIN/GLOB SERPL: 1.6 {RATIO} (ref 1.2–2.2)
ALP SERPL-CCNC: 135 IU/L (ref 39–117)
ALT SERPL-CCNC: 17 IU/L (ref 0–44)
AST SERPL-CCNC: 23 IU/L (ref 0–40)
BASOPHILS # BLD AUTO: 0 X10E3/UL (ref 0–0.2)
BASOPHILS NFR BLD AUTO: 0 %
BILIRUB SERPL-MCNC: 0.6 MG/DL (ref 0–1.2)
BUN SERPL-MCNC: 9 MG/DL (ref 8–27)
BUN/CREAT SERPL: 10 (ref 10–24)
CALCIUM SERPL-MCNC: 9.3 MG/DL (ref 8.6–10.2)
CHLORIDE SERPL-SCNC: 102 MMOL/L (ref 96–106)
CO2 SERPL-SCNC: 23 MMOL/L (ref 20–29)
CREAT SERPL-MCNC: 0.93 MG/DL (ref 0.76–1.27)
EOSINOPHIL # BLD AUTO: 0.1 X10E3/UL (ref 0–0.4)
EOSINOPHIL NFR BLD AUTO: 2 %
ERYTHROCYTE [DISTWIDTH] IN BLOOD BY AUTOMATED COUNT: 13.2 % (ref 12.3–15.4)
GLOBULIN SER CALC-MCNC: 2.7 G/DL (ref 1.5–4.5)
GLUCOSE SERPL-MCNC: 98 MG/DL (ref 65–99)
HCT VFR BLD AUTO: 41.9 % (ref 37.5–51)
HGB BLD-MCNC: 13.5 G/DL (ref 13–17.7)
IMM GRANULOCYTES # BLD: 0 X10E3/UL (ref 0–0.1)
IMM GRANULOCYTES NFR BLD: 0 %
LYMPHOCYTES # BLD AUTO: 1.5 X10E3/UL (ref 0.7–3.1)
LYMPHOCYTES NFR BLD AUTO: 18 %
MCH RBC QN AUTO: 29.3 PG (ref 26.6–33)
MCHC RBC AUTO-ENTMCNC: 32.2 G/DL (ref 31.5–35.7)
MCV RBC AUTO: 91 FL (ref 79–97)
MONOCYTES # BLD AUTO: 0.5 X10E3/UL (ref 0.1–0.9)
MONOCYTES NFR BLD AUTO: 6 %
NEUTROPHILS # BLD AUTO: 6.3 X10E3/UL (ref 1.4–7)
NEUTROPHILS NFR BLD AUTO: 74 %
PLATELET # BLD AUTO: 296 X10E3/UL (ref 150–379)
POTASSIUM SERPL-SCNC: 4 MMOL/L (ref 3.5–5.2)
PROT SERPL-MCNC: 7.1 G/DL (ref 6–8.5)
RBC # BLD AUTO: 4.61 X10E6/UL (ref 4.14–5.8)
SODIUM SERPL-SCNC: 144 MMOL/L (ref 134–144)
WBC # BLD AUTO: 8.4 X10E3/UL (ref 3.4–10.8)

## 2018-07-10 NOTE — PROGRESS NOTES
Alk Phos elevated, LFT = wnl; will check Vit D. Result letter mailed to pt and faxed to pre-op office.

## 2018-07-12 ENCOUNTER — TELEPHONE (OUTPATIENT)
Dept: FAMILY MEDICINE CLINIC | Age: 83
End: 2018-07-12

## 2018-07-12 DIAGNOSIS — E55.9 VITAMIN D DEFICIENCY: Primary | ICD-10-CM

## 2018-07-12 RX ORDER — ERGOCALCIFEROL 1.25 MG/1
50000 CAPSULE ORAL
Qty: 12 CAP | Refills: 0 | Status: SHIPPED | OUTPATIENT
Start: 2018-07-12 | End: 2019-11-06

## 2018-07-17 LAB
25(OH)D3+25(OH)D2 SERPL-MCNC: 21.4 NG/ML (ref 30–100)
SPECIMEN STATUS REPORT, ROLRST: NORMAL

## 2018-07-17 NOTE — PROGRESS NOTES
P/c - rx sent in for 12wks, then 2,ooo units OTC supplement, recheck in 3-4 months. Result letter mailed.

## 2018-07-20 NOTE — PERIOP NOTES
PAT PHONE INTERVIEW COMPLETED WITH PT'S WIFE, SHE WAS GIVEN INFECTION PREVENTION INFORMATION VERBALLY, AND VOICED UNDERSTANDING. PT'S WIFE WAS GIVEN THE OPPORTUNITY TO ASK ADDITIONAL QUESTIONS.     PRE OP ORDERS, IF SENT TO PAT, HAVE ALREADY BEEN SENT TO [facility]

## 2018-07-23 ENCOUNTER — HOSPITAL ENCOUNTER (OUTPATIENT)
Age: 83
Setting detail: OUTPATIENT SURGERY
Discharge: HOME | End: 2018-07-23
Attending: OTOLARYNGOLOGY | Admitting: OTOLARYNGOLOGY
Payer: MEDICARE

## 2018-07-23 ENCOUNTER — ANESTHESIA (OUTPATIENT)
Dept: SURGERY | Age: 83
End: 2018-07-23
Payer: MEDICARE

## 2018-07-23 ENCOUNTER — ANESTHESIA EVENT (OUTPATIENT)
Dept: SURGERY | Age: 83
End: 2018-07-23
Payer: MEDICARE

## 2018-07-23 VITALS
TEMPERATURE: 96.6 F | OXYGEN SATURATION: 97 % | DIASTOLIC BLOOD PRESSURE: 95 MMHG | BODY MASS INDEX: 20.15 KG/M2 | SYSTOLIC BLOOD PRESSURE: 169 MMHG | HEART RATE: 75 BPM | HEIGHT: 74 IN | WEIGHT: 157 LBS | RESPIRATION RATE: 13 BRPM

## 2018-07-23 PROCEDURE — 74011250636 HC RX REV CODE- 250/636

## 2018-07-23 PROCEDURE — 77030008698 HC TU ET REINF MEDT -D: Performed by: ANESTHESIOLOGY

## 2018-07-23 PROCEDURE — 77030032490 HC SLV COMPR SCD KNE COVD -B: Performed by: OTOLARYNGOLOGY

## 2018-07-23 PROCEDURE — 88331 PATH CONSLTJ SURG 1 BLK 1SPC: CPT | Performed by: OTOLARYNGOLOGY

## 2018-07-23 PROCEDURE — 77030020782 HC GWN BAIR PAWS FLX 3M -B

## 2018-07-23 PROCEDURE — 74011000258 HC RX REV CODE- 258: Performed by: ANESTHESIOLOGY

## 2018-07-23 PROCEDURE — 76010000149 HC OR TIME 1 TO 1.5 HR: Performed by: OTOLARYNGOLOGY

## 2018-07-23 PROCEDURE — 76060000034 HC ANESTHESIA 1.5 TO 2 HR: Performed by: OTOLARYNGOLOGY

## 2018-07-23 PROCEDURE — 88342 IMHCHEM/IMCYTCHM 1ST ANTB: CPT | Performed by: OTOLARYNGOLOGY

## 2018-07-23 PROCEDURE — 76210000021 HC REC RM PH II 0.5 TO 1 HR: Performed by: OTOLARYNGOLOGY

## 2018-07-23 PROCEDURE — 76210000016 HC OR PH I REC 1 TO 1.5 HR: Performed by: OTOLARYNGOLOGY

## 2018-07-23 PROCEDURE — 74011250637 HC RX REV CODE- 250/637: Performed by: OTOLARYNGOLOGY

## 2018-07-23 PROCEDURE — 77030012602 HC SPNG PTTY NEUR J&J -B: Performed by: OTOLARYNGOLOGY

## 2018-07-23 PROCEDURE — 88305 TISSUE EXAM BY PATHOLOGIST: CPT | Performed by: OTOLARYNGOLOGY

## 2018-07-23 PROCEDURE — 77030018836 HC SOL IRR NACL ICUM -A: Performed by: OTOLARYNGOLOGY

## 2018-07-23 PROCEDURE — 74011000250 HC RX REV CODE- 250

## 2018-07-23 PROCEDURE — 74011250636 HC RX REV CODE- 250/636: Performed by: ANESTHESIOLOGY

## 2018-07-23 PROCEDURE — 77030008477 HC STYL SATN SLP COVD -A: Performed by: ANESTHESIOLOGY

## 2018-07-23 RX ORDER — FENTANYL CITRATE 50 UG/ML
25 INJECTION, SOLUTION INTRAMUSCULAR; INTRAVENOUS
Status: COMPLETED | OUTPATIENT
Start: 2018-07-23 | End: 2018-07-23

## 2018-07-23 RX ORDER — MIDAZOLAM HYDROCHLORIDE 1 MG/ML
1 INJECTION, SOLUTION INTRAMUSCULAR; INTRAVENOUS AS NEEDED
Status: DISCONTINUED | OUTPATIENT
Start: 2018-07-23 | End: 2018-07-23 | Stop reason: HOSPADM

## 2018-07-23 RX ORDER — ONDANSETRON 2 MG/ML
4 INJECTION INTRAMUSCULAR; INTRAVENOUS AS NEEDED
Status: DISCONTINUED | OUTPATIENT
Start: 2018-07-23 | End: 2018-07-23 | Stop reason: HOSPADM

## 2018-07-23 RX ORDER — SODIUM CHLORIDE 0.9 % (FLUSH) 0.9 %
5-10 SYRINGE (ML) INJECTION AS NEEDED
Status: DISCONTINUED | OUTPATIENT
Start: 2018-07-23 | End: 2018-07-23 | Stop reason: HOSPADM

## 2018-07-23 RX ORDER — PROCHLORPERAZINE EDISYLATE 5 MG/ML
INJECTION INTRAMUSCULAR; INTRAVENOUS
Status: COMPLETED
Start: 2018-07-23 | End: 2018-07-23

## 2018-07-23 RX ORDER — SODIUM CHLORIDE, SODIUM LACTATE, POTASSIUM CHLORIDE, CALCIUM CHLORIDE 600; 310; 30; 20 MG/100ML; MG/100ML; MG/100ML; MG/100ML
125 INJECTION, SOLUTION INTRAVENOUS CONTINUOUS
Status: DISCONTINUED | OUTPATIENT
Start: 2018-07-23 | End: 2018-07-23 | Stop reason: HOSPADM

## 2018-07-23 RX ORDER — PROPOFOL 10 MG/ML
INJECTION, EMULSION INTRAVENOUS AS NEEDED
Status: DISCONTINUED | OUTPATIENT
Start: 2018-07-23 | End: 2018-07-23 | Stop reason: HOSPADM

## 2018-07-23 RX ORDER — DEXAMETHASONE SODIUM PHOSPHATE 4 MG/ML
INJECTION, SOLUTION INTRA-ARTICULAR; INTRALESIONAL; INTRAMUSCULAR; INTRAVENOUS; SOFT TISSUE AS NEEDED
Status: DISCONTINUED | OUTPATIENT
Start: 2018-07-23 | End: 2018-07-23 | Stop reason: HOSPADM

## 2018-07-23 RX ORDER — ONDANSETRON 4 MG/1
4 TABLET, ORALLY DISINTEGRATING ORAL
Qty: 8 TAB | Refills: 1 | Status: SHIPPED | OUTPATIENT
Start: 2018-07-23 | End: 2018-09-27

## 2018-07-23 RX ORDER — MIDAZOLAM HYDROCHLORIDE 1 MG/ML
0.5 INJECTION, SOLUTION INTRAMUSCULAR; INTRAVENOUS
Status: DISCONTINUED | OUTPATIENT
Start: 2018-07-23 | End: 2018-07-23 | Stop reason: HOSPADM

## 2018-07-23 RX ORDER — OXYCODONE AND ACETAMINOPHEN 5; 325 MG/1; MG/1
1 TABLET ORAL AS NEEDED
Status: DISCONTINUED | OUTPATIENT
Start: 2018-07-23 | End: 2018-07-23 | Stop reason: HOSPADM

## 2018-07-23 RX ORDER — EPINEPHRINE NASAL SOLUTION 1 MG/ML
SOLUTION NASAL AS NEEDED
Status: DISCONTINUED | OUTPATIENT
Start: 2018-07-23 | End: 2018-07-23 | Stop reason: HOSPADM

## 2018-07-23 RX ORDER — SODIUM CHLORIDE 9 MG/ML
1000 INJECTION, SOLUTION INTRAVENOUS CONTINUOUS
Status: DISCONTINUED | OUTPATIENT
Start: 2018-07-23 | End: 2018-07-23 | Stop reason: HOSPADM

## 2018-07-23 RX ORDER — SODIUM CHLORIDE 0.9 % (FLUSH) 0.9 %
SYRINGE (ML) INJECTION
Status: COMPLETED
Start: 2018-07-23 | End: 2018-07-23

## 2018-07-23 RX ORDER — SUCCINYLCHOLINE CHLORIDE 20 MG/ML
INJECTION INTRAMUSCULAR; INTRAVENOUS AS NEEDED
Status: DISCONTINUED | OUTPATIENT
Start: 2018-07-23 | End: 2018-07-23 | Stop reason: HOSPADM

## 2018-07-23 RX ORDER — ROCURONIUM BROMIDE 10 MG/ML
INJECTION, SOLUTION INTRAVENOUS AS NEEDED
Status: DISCONTINUED | OUTPATIENT
Start: 2018-07-23 | End: 2018-07-23 | Stop reason: HOSPADM

## 2018-07-23 RX ORDER — LIDOCAINE HYDROCHLORIDE 10 MG/ML
0.1 INJECTION, SOLUTION EPIDURAL; INFILTRATION; INTRACAUDAL; PERINEURAL AS NEEDED
Status: DISCONTINUED | OUTPATIENT
Start: 2018-07-23 | End: 2018-07-23 | Stop reason: HOSPADM

## 2018-07-23 RX ORDER — DIPHENHYDRAMINE HYDROCHLORIDE 50 MG/ML
12.5 INJECTION, SOLUTION INTRAMUSCULAR; INTRAVENOUS AS NEEDED
Status: DISCONTINUED | OUTPATIENT
Start: 2018-07-23 | End: 2018-07-23 | Stop reason: HOSPADM

## 2018-07-23 RX ORDER — SODIUM CHLORIDE 9 MG/ML
50 INJECTION, SOLUTION INTRAVENOUS CONTINUOUS
Status: DISCONTINUED | OUTPATIENT
Start: 2018-07-23 | End: 2018-07-23 | Stop reason: HOSPADM

## 2018-07-23 RX ORDER — FENTANYL CITRATE 50 UG/ML
INJECTION, SOLUTION INTRAMUSCULAR; INTRAVENOUS AS NEEDED
Status: DISCONTINUED | OUTPATIENT
Start: 2018-07-23 | End: 2018-07-23 | Stop reason: HOSPADM

## 2018-07-23 RX ORDER — FENTANYL CITRATE 50 UG/ML
50 INJECTION, SOLUTION INTRAMUSCULAR; INTRAVENOUS AS NEEDED
Status: DISCONTINUED | OUTPATIENT
Start: 2018-07-23 | End: 2018-07-23 | Stop reason: HOSPADM

## 2018-07-23 RX ORDER — SODIUM CHLORIDE 0.9 % (FLUSH) 0.9 %
5-10 SYRINGE (ML) INJECTION EVERY 8 HOURS
Status: DISCONTINUED | OUTPATIENT
Start: 2018-07-23 | End: 2018-07-23 | Stop reason: HOSPADM

## 2018-07-23 RX ORDER — ONDANSETRON 2 MG/ML
INJECTION INTRAMUSCULAR; INTRAVENOUS AS NEEDED
Status: DISCONTINUED | OUTPATIENT
Start: 2018-07-23 | End: 2018-07-23 | Stop reason: HOSPADM

## 2018-07-23 RX ORDER — LIDOCAINE HYDROCHLORIDE 20 MG/ML
INJECTION, SOLUTION EPIDURAL; INFILTRATION; INTRACAUDAL; PERINEURAL AS NEEDED
Status: DISCONTINUED | OUTPATIENT
Start: 2018-07-23 | End: 2018-07-23 | Stop reason: HOSPADM

## 2018-07-23 RX ORDER — MORPHINE SULFATE 10 MG/ML
2 INJECTION, SOLUTION INTRAMUSCULAR; INTRAVENOUS
Status: DISCONTINUED | OUTPATIENT
Start: 2018-07-23 | End: 2018-07-23 | Stop reason: HOSPADM

## 2018-07-23 RX ADMIN — Medication 10 ML: at 14:00

## 2018-07-23 RX ADMIN — PROCHLORPERAZINE EDISYLATE 5 MG: 5 INJECTION INTRAMUSCULAR; INTRAVENOUS at 14:00

## 2018-07-23 RX ADMIN — FENTANYL CITRATE 50 MCG: 50 INJECTION, SOLUTION INTRAMUSCULAR; INTRAVENOUS at 10:56

## 2018-07-23 RX ADMIN — PROPOFOL 150 MG: 10 INJECTION, EMULSION INTRAVENOUS at 10:56

## 2018-07-23 RX ADMIN — FENTANYL CITRATE 50 MCG: 50 INJECTION, SOLUTION INTRAMUSCULAR; INTRAVENOUS at 11:07

## 2018-07-23 RX ADMIN — ONDANSETRON 4 MG: 2 INJECTION INTRAMUSCULAR; INTRAVENOUS at 12:47

## 2018-07-23 RX ADMIN — ROCURONIUM BROMIDE 10 MG: 10 INJECTION, SOLUTION INTRAVENOUS at 10:56

## 2018-07-23 RX ADMIN — FENTANYL CITRATE 25 MCG: 50 INJECTION, SOLUTION INTRAMUSCULAR; INTRAVENOUS at 12:56

## 2018-07-23 RX ADMIN — FENTANYL CITRATE 25 MCG: 50 INJECTION, SOLUTION INTRAMUSCULAR; INTRAVENOUS at 12:46

## 2018-07-23 RX ADMIN — MORPHINE SULFATE 2 MG: 10 INJECTION INTRAVENOUS at 13:30

## 2018-07-23 RX ADMIN — MORPHINE SULFATE 2 MG: 10 INJECTION INTRAVENOUS at 13:25

## 2018-07-23 RX ADMIN — FENTANYL CITRATE 25 MCG: 50 INJECTION, SOLUTION INTRAMUSCULAR; INTRAVENOUS at 12:51

## 2018-07-23 RX ADMIN — SODIUM CHLORIDE, SODIUM LACTATE, POTASSIUM CHLORIDE, AND CALCIUM CHLORIDE 125 ML/HR: 600; 310; 30; 20 INJECTION, SOLUTION INTRAVENOUS at 12:48

## 2018-07-23 RX ADMIN — SODIUM CHLORIDE, SODIUM LACTATE, POTASSIUM CHLORIDE, AND CALCIUM CHLORIDE 125 ML/HR: 600; 310; 30; 20 INJECTION, SOLUTION INTRAVENOUS at 10:24

## 2018-07-23 RX ADMIN — FENTANYL CITRATE 25 MCG: 50 INJECTION, SOLUTION INTRAMUSCULAR; INTRAVENOUS at 13:03

## 2018-07-23 RX ADMIN — ONDANSETRON 4 MG: 2 INJECTION INTRAMUSCULAR; INTRAVENOUS at 11:08

## 2018-07-23 RX ADMIN — SUCCINYLCHOLINE CHLORIDE 140 MG: 20 INJECTION INTRAMUSCULAR; INTRAVENOUS at 10:56

## 2018-07-23 RX ADMIN — FENTANYL CITRATE 50 MCG: 50 INJECTION, SOLUTION INTRAMUSCULAR; INTRAVENOUS at 11:44

## 2018-07-23 RX ADMIN — DEXAMETHASONE SODIUM PHOSPHATE 4 MG: 4 INJECTION, SOLUTION INTRA-ARTICULAR; INTRALESIONAL; INTRAMUSCULAR; INTRAVENOUS; SOFT TISSUE at 11:08

## 2018-07-23 RX ADMIN — LIDOCAINE HYDROCHLORIDE 80 MG: 20 INJECTION, SOLUTION EPIDURAL; INFILTRATION; INTRACAUDAL; PERINEURAL at 10:56

## 2018-07-23 NOTE — BRIEF OP NOTE
BRIEF OPERATIVE NOTE    Date of Procedure: 7/23/2018   Preoperative Diagnosis: DYSPHONIA  HEAD OR NECK SWELLING, MASS, OR LUMP   Postoperative Diagnosis: DYSPHONIA  HEAD OR NECK SWELLING, MASS, OR LUMP     Procedure(s):  DIRECT LARYNGOSCOPY  WITH EXCISION OF RIGHT VOCAL CORD NEOPLASM USING OPERATING MICROSCOPE (CO2 FIBER LASER)   Surgeon(s) and Role:     Ute Posada IV, MD - Primary         Surgical Assistant: none    Surgical Staff:  Circ-1: Susy Nunez RN  Circ-Relief: Evangelist Porter  Scrub RN-1: Jackie Jenkins RN  Scrub RN-Relief: Karime Parikh RN  Event Time In   Incision Start 1107   Incision Close 1201     Anesthesia: General   Estimated Blood Loss: 10ml  Specimens:   ID Type Source Tests Collected by Time Destination   1 : right true vocal cord Frozen Section Throat  Srinivas Posada IV, MD 7/23/2018 1115 Pathology   2 : right true vocal cord Fresh Throat  Srinivas Posada IV, MD 7/23/2018 1147 Pathology      Findings: 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

## 2018-07-23 NOTE — ANESTHESIA PREPROCEDURE EVALUATION
Anesthetic History   No history of anesthetic complications            Review of Systems / Medical History  Patient summary reviewed, nursing notes reviewed and pertinent labs reviewed    Pulmonary  Within defined limits                 Neuro/Psych   Within defined limits           Cardiovascular  Within defined limits                     GI/Hepatic/Renal  Within defined limits              Endo/Other  Within defined limits      Arthritis     Other Findings              Physical Exam    Airway  Mallampati: I  TM Distance: > 6 cm  Neck ROM: normal range of motion   Mouth opening: Normal     Cardiovascular  Regular rate and rhythm,  S1 and S2 normal,  no murmur, click, rub, or gallop             Dental  No notable dental hx       Pulmonary  Breath sounds clear to auscultation               Abdominal  GI exam deferred       Other Findings            Anesthetic Plan    ASA: 2  Anesthesia type: general          Induction: Intravenous  Anesthetic plan and risks discussed with: Patient

## 2018-07-23 NOTE — OP NOTES
[address] [place] [address] REPORT    Radhames Brandon  MR#: [id number]  : 1935  ACCOUNT #: [de-identified]   DATE OF SERVICE: 2018    SURGEON:  Jair Whipple MD    ASSISTANT:  none    PREOPERATIVE DIAGNOSES:    1. Hoarseness. 2.  Right vocal cord lesion. POSTOPERATIVE DIAGNOSES:  1. Hoarseness. 2.  Right vocal cord lesion. PROCEDURES:  Microdirect laryngoscopy with CO2 laser excision of right vocal cord lesion. ANESTHESIA:  General endotracheal anesthesia. ESTIMATED BLOOD LOSS:  10 mL. COMPLICATIONS:  None. SPECIMENS REMOVED:  Right vocal cord lesion    IMPLANTS:  none    INDICATION FOR PROCEDURE:  This is an 81-year-old gentleman who initially presented several years ago with progressive hoarseness and on exam in the office, he was noted to have raised mass in the right vocal cord. He was recommended to undergo operative biopsy, but did not follow through. He presents more recently with persistent symptoms. Exam in the office again revealed a lesion that appeared to be replaced in the right vocal cord. Based on this, he was scheduled for elective laryngoscopy with laser excision. Risks of surgery discussed include bleeding, infection, pain, airway complication, persistent hoarseness, need for further surgery. OPERATIVE DETAIL:  The patient was brought to the operating room and general endotracheal anesthesia was induced. The patient was intubated with a laser reinforced endotracheal tube. A tooth guard was placed to protect the teeth. The direct laryngoscope was then inserted in the oral cavity and advanced to visualize the pharynx. The pharynx was normal.  The scope was then advanced to visualize the larynx and placed into suspension. 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 It extended up into the ventricle. It also extended subglottically and slightly across the anterior commissure.   Cup forceps was then used for retraction and the AcuPulse WaveGuide CO2 fiber laser was then used to resect the lesion. The lateral incision was made through the ventricle just lateral to the lesion. It was then continued dissecting out of the lesion down into the subglottis. The specimen was removed and sent for frozen section. It returned as papillomas but suspicious for at least dysplasia. Once the majority of the lesion could be removed, the larynx could be better examined. Again, as stated above, there was also some subglottic extension as well as a small amount of extension across the anterior commissure. This was all ablated with the laser such that all gross abnormal tissue was removed. Afrin-soaked pledgets were placed for hemostasis. At this time, there was no further bleeding. The scope was taken out of suspension, removed from the patient. The patient was then awakened, extubated, and taken to PACU in stable condition.       MD PREM RazoTM / LN  D: 07/23/2018 12:49     T: 07/23/2018 16:02  JOB #: [id number]

## 2018-07-23 NOTE — ANESTHESIA POSTPROCEDURE EVALUATION
Post-Anesthesia Evaluation and Assessment    Patient: Jenna An MRN: [id number]  SSN: [id number]    YOB: 1935  Age: 81 y.o. Sex: male       Cardiovascular Function/Vital Signs  Visit Vitals    BP (!) 174/92    Pulse 73    Temp 35.9 °C (96.6 °F)    Resp 22    Ht 6' 1.5\" (1.867 m)    Wt 71.2 kg (157 lb)    SpO2 100%    BMI 20.43 kg/m2       Patient is status post general anesthesia for Procedure(s):  DIRECT LARYNGOSCOPY  WITH EXCISION OF NEOPLASM USING OPERATING MICROSCOPE (CO2 FIBER LASER) . Nausea/Vomiting: None    Postoperative hydration reviewed and adequate. Pain:  Pain Scale 1: Numeric (0 - 10) (07/23/18 1003)  Pain Intensity 1: 0 (07/23/18 1003)   Managed    Neurological Status:   Neuro (WDL): Within Defined Limits (07/23/18 1022)   At baseline    Mental Status and Level of Consciousness: Arousable    Pulmonary Status:   O2 Device: Nasal cannula (07/23/18 1217)   Adequate oxygenation and airway patent    Complications related to anesthesia: None    Post-anesthesia assessment completed.  No concerns    Signed By: Oneal Nunes MD     July 23, 2018

## 2018-07-23 NOTE — ROUTINE PROCESS
Handoff Report from Operating Room to PACU    Report received from ASIF mckeon Rn regarding Amarjit Prom. Surgeon(s):  Marifer Noriega MD  And Procedure(s) (LRB):  DIRECT LARYNGOSCOPY  WITH EXCISION OF NEOPLASM USING OPERATING MICROSCOPE (CO2 FIBER LASER)  (N/A)  confirmed     Anesthesia type, drugs, patient history, complications, estimated blood loss, vital signs, intake and output, and  were reviewed.  See [facility]

## 2018-07-23 NOTE — IP AVS SNAPSHOT
[address] [facility] [address] 
[phone] Patient: Moriah Solorzano MRN: [id number] :1935 About your hospitalization You were admitted on:  2018 You last received care in the:  [facility] PACU You were discharged on:  2018 Why you were hospitalized Your primary diagnosis was:  Not on File Follow-up Information Follow up With Details Comments Contact Info Leonel Bell NP   [address] [facility] 
[address] [place] [address] 
[phone] Lena Brennan MD Schedule an appointment as soon as possible for a visit in 1 week(s)  [address] [place] 
[phone] Discharge Orders None A check becky indicates which time of day the medication should be taken. My Medications START taking these medications Instructions Each Dose to Equal  
 Morning Noon Evening Bedtime  
 ondansetron 4 mg disintegrating tablet Commonly known as:  ZOFRAN ODT Your last dose was: Your next dose is: Take 1 Tab by mouth every eight (8) hours as needed for Nausea. 4 mg CHANGE how you take these medications Instructions Each Dose to Equal  
 Morning Noon Evening Bedtime  
 ergocalciferol 50,000 unit capsule Commonly known as:  ERGOCALCIFEROL What changed:  additional instructions Your last dose was: Your next dose is: Take 1 Cap by mouth every seven (7) days. Indications: VITAMIN D DEFICIENCY (HIGH DOSE THERAPY) 54968 Units Where to Get Your Medications Information on where to get these meds will be given to you by the nurse or doctor. ! Ask your nurse or doctor about these medications  
  ondansetron 4 mg disintegrating tablet Discharge Instructions [address], [address] [facility] Operative Instructions 1. DIET Start a soft diet and progress to usual diet as tolerated, unless otherwise directed. It is important to remember that good overall diet and health promotes healing. 2.  ACTIVITY No heavy exertion or heavy lifting for 5 days. Light activities are permitted. Rest voice as much as possible for 1 week. 3.  THINGS TO BE CONCERNED ABOUT Please call the office for any of these changes A. Increasing pain B. Neck swelling C. Difficulty breathing If you have any questions or concerns following your surgery, do not hesitate to contact our office at Office Phone:  [phone] [place] [address] [facility] office hours are 8:00 a.m. to 4:30 p.m. You should be able to reach us after hours by calling the regular office number. If for some reason you are not able to reach our [address] [place] [address] through this main number you may call them directly at [phone]. DISCHARGE SUMMARY from Nurse PATIENT INSTRUCTIONS: 
 
After general anesthesia or intravenous sedation, for 24 hours or while taking prescription Narcotics: · Limit your activities · Do not drive and operate hazardous machinery · Do not make important personal or business decisions · Do  not drink alcoholic beverages · If you have not urinated within 8 hours after discharge, please contact your surgeon on call. Report the following to your surgeon: 
· Excessive pain, swelling, redness or odor of or around the surgical area · Temperature over 100.5 · Nausea and vomiting lasting longer than 4 hours or if unable to take medications · Any signs of decreased circulation or nerve impairment to extremity: change in color, persistent  numbness, tingling, coldness or increase pain · Any questions The discharge information has been reviewed with the {PATIENT PARENT GUARDIAN:67784}. The {PATIENT PARENT GUARDIAN:44631} verbalized understanding. Discharge medications reviewed with the {Dishcarge meds reviewed YOTZ:41810} and appropriate educational materials and side effects teaching were provided. ___________________________________________________________________________________________________________________________________ Introducing [facility]! Marcelo Parks introduces [employer] patient portal. Now you can access parts of your medical record, email your doctor's office, and request medication refills online. 1. In your internet browser, go to [email or web address] 2. Click on the First Time User? Click Here link in the Sign In box. You will see the New Member Sign Up page. 3. Enter your Fontacto Access Code exactly as it appears below. You will not need to use this code after youve completed the sign-up process. If you do not sign up before the expiration date, you must request a new code. · Fontacto Access Code: [email or web address] Expires: 8/23/2018 10:16 AM 
 
4. Enter the last four digits of your Social Security Number (xxxx) and Date of Birth (mm/dd/yyyy) as indicated and click Submit. You will be taken to the next sign-up page. 5. Create a [employer] ID. This will be your Fontacto login ID and cannot be changed, so think of one that is secure and easy to remember. 6. Create a [employer] password. You can change your password at any time. 7. Enter your Password Reset Question and Answer. This can be used at a later time if you forget your password. 8. Enter your e-mail address. You will receive e-mail notification when new information is available in [address] 9. Click Sign Up. You can now view and download portions of your medical record. 10. Click the Download Summary menu link to download a portable copy of your medical information. If you have questions, please visit the Frequently Asked Questions section of the Fontacto website.  Remember, Fontacto is NOT to be used for urgent needs. For medical emergencies, dial 911. Now available from your iPhone and Android! Introducing Romie Chavez As a [facility] patient, I wanted to make you aware of our electronic visit tool called Romie Chavez. [employer] allows you to connect within minutes with a medical provider 24 hours a day, seven days a week via a mobile device or tablet or logging into a secure website from your computer. You can access Romie Chavez from anywhere in the [place]. A virtual visit might be right for you when you have a simple condition and feel like you just dont want to get out of bed, or cant get away from work for an appointment, when your regular [facility] provider is not available (evenings, weekends or holidays), or when youre out of town and need minor care. Electronic visits cost only $49 and if the [employer] provider determines a prescription is needed to treat your condition, one can be electronically transmitted to a nearby pharmacy*. Please take a moment to enroll today if you have not already done so. The enrollment process is free and takes just a few minutes. To enroll, please download the Drillster/SuperDimension milton to your tablet or phone, or visit [email or web address] to enroll on your computer. And, as an [address] [place] [address] patient with a [employer] account, the results of your visits will be scanned into your electronic medical record and your primary care provider will be able to view the scanned results. We urge you to continue to see your regular [facility] provider for your ongoing medical care. And while your primary care provider may not be the one available when you seek a Romie Chavez virtual visit, the peace of mind you get from getting a real diagnosis real time can be priceless. For more information on Romie Chavez, view our Frequently Asked Questions (FAQs) at [email or web address].  
 
Sincerely, 
 
 Rene Zaragoza MD 
Chief Medical Officer [address] *:  certain medications cannot be prescribed via Romie Chavez Providers Seen During Your Hospitalization Provider Specialty Primary office phone Eugene Camejo MD Otolaryngology [phone] Your Primary Care Physician (PCP) Primary Care Physician Office Phone Office Fax [address], [address] [facility] [phone] You are allergic to the following No active allergies Recent Documentation Height Weight BMI Smoking Status 1.867 m 71.2 kg 20.43 kg/m2 Former Smoker Emergency Contacts Name Discharge Info Relation Home Work Mobile Catalina Borja DISCHARGE CAREGIVER [3] Spouse [3] [phone] Alicia Borja  Daughter [21] [phone] Hanane Shankar  Daughter [21] [phone] ChigwadaRadhika DISCHARGE CAREGIVER [3] Daughter [21] [phone] Patient Belongings The following personal items are in your possession at time of discharge: 
  Dental Appliances: None  Visual Aid: None   Hearing Aids/Status:  (NA)         Clothing:  (clothes bag to pacu) Discharge Instructions Attachments/References MEFS - ONDANSETRON (ZOFRAN, ZOFRAN ODT, ZUPLENZ) - (BY MOUTH, INTO THE MOUTH) (ENGLISH) Patient Handouts Ondansetron (Zofran, Zofran ODT, Zuplenz) - (By mouth, Into the mouth) Why this medicine is used:  
Prevents nausea and vomiting. Contact a nurse or doctor right away if you have: 
· Fast, pounding, or uneven heartbeat · Lightheadedness or fainting · Trouble breathing Common side effects: 
· Headache, tiredness · Constipation, diarrhea © 2017 [facility] Information is for End User's use only and may not be sold, redistributed or otherwise used for commercial purposes. Please provide this summary of care documentation to your next provider. Signatures-by signing, you are acknowledging that this After Visit Summary has been reviewed with you and you have received a copy. Patient Signature:  ____________________________________________________________ Date:  ____________________________________________________________  
  
[facility] Provider Signature:  ____________________________________________________________ Date:  ____________________________________________________________

## 2018-09-17 DIAGNOSIS — E55.9 VITAMIN D DEFICIENCY: ICD-10-CM

## 2018-09-27 ENCOUNTER — TELEPHONE (OUTPATIENT)
Dept: NEUROLOGY | Age: 83
End: 2018-09-27

## 2018-09-27 ENCOUNTER — OFFICE VISIT (OUTPATIENT)
Dept: NEUROLOGY | Age: 83
End: 2018-09-27

## 2018-09-27 VITALS
DIASTOLIC BLOOD PRESSURE: 80 MMHG | HEART RATE: 83 BPM | BODY MASS INDEX: 20.17 KG/M2 | WEIGHT: 155 LBS | RESPIRATION RATE: 18 BRPM | OXYGEN SATURATION: 98 % | SYSTOLIC BLOOD PRESSURE: 140 MMHG

## 2018-09-27 DIAGNOSIS — F03.90 DEMENTIA WITHOUT BEHAVIORAL DISTURBANCE, UNSPECIFIED DEMENTIA TYPE: Primary | ICD-10-CM

## 2018-09-27 RX ORDER — MEMANTINE HYDROCHLORIDE 5 MG/1
TABLET ORAL
Qty: 60 TAB | Refills: 2 | Status: SHIPPED | OUTPATIENT
Start: 2018-09-27 | End: 2018-10-31 | Stop reason: SDUPTHER

## 2018-09-27 NOTE — PROGRESS NOTES
[facility]   NEW PATIENT EVALUATION/CONSULTATION       PATIENT NAME: Cuauhtemoc Stiles    MRN: [id number]    REASON FOR CONSULTATION: Memory impairment    09/27/18      Previous records (physician notes, laboratory reports, and radiology reports) and imaging studies were reviewed and summarized. My recommendations will be communicated back to the patient's physician(s) via electronic medical record and/or by [address] mail. The patient was accompanied by his wife. HISTORY OF PRESENT ILLNESS:  Cuauhtemoc Stiles is a 81 y.o. left handed male presenting for evaluation of memory impairment. Onset and progression: 7-8 years with progression    Neuropsychiatric symptoms    By Family members account:    Problems with judgment:No   Reduced interest in hobbies/activities: Yes   Repeats questions, stories, or statements: Yes    Trouble recalling people's names: Yes   Trouble learning how to use a tool or appliance: No   Forgetting the correct month or year: Yes   Difficulty handling financial affairs (bill-paying, taxes): No   Difficulty remembering appointments:Yes    Memory: short term recall deficits  Language: no word finding difficulty  Change in personality: no change  Socially inappropriate behavior: none  Change in eating habits: eating less  Physical changes: Denies  Depressive symptoms: Denies  Hallucinations/Delusions: Denies    Ability to function:  Driving: not driving  Finances: handles some finances but assisted by his wife  Cooking: no  Manages own medication: administered by his wife  Residing: at home with his wife    Prior work-up: [facility] evaluation (no records available).       Prior treatments: yes but wife cannot recall- discontinued due to nausea/lightheadedness      PAST MEDICAL HISTORY:  Past Medical History:   Diagnosis Date    Arthritis     Dementia        PAST SURGICAL HISTORY:  Past Surgical History:   Procedure Laterality Date    HX HERNIA REPAIR      HX UROLOGICAL      \"COULDN'T PASS URINE, HAD SURGERY, CAME HOME WITH CATHETER\"       FAMILY HISTORY:   Family History   Problem Relation Age of Onset    Stroke Mother     Heart Attack Father     Heart Disease Sister     Heart Attack Sister     Stroke Daughter 43    Migraines Daughter     Thyroid Disease Daughter     No Known Problems Daughter     No Known Problems Daughter     No Known Problems Daughter     No Known Problems Son     Anesth Problems Neg Hx          SOCIAL HISTORY:  Social History     Social History    Marital status:      Spouse name: N/A    Number of children: N/A    Years of education: N/A     Social History Main Topics    Smoking status: Former Smoker     Quit date: 7/20/2008    Smokeless tobacco: Never Used      Comment: CIGARS AND PIPES    Alcohol use Yes      Comment: WINE ONCE IN A WHILE    Drug use: No    Sexual activity: Not Asked     Other Topics Concern    None     Social History Narrative         MEDICATIONS:   Current Outpatient Prescriptions   Medication Sig Dispense Refill    ergocalciferol (ERGOCALCIFEROL) 50,000 unit capsule Take 1 Cap by mouth every seven (7) days. Indications: VITAMIN D DEFICIENCY (HIGH DOSE THERAPY) (Patient taking differently: Take 50,000 Units by mouth every seven (7) days. MONDAY  Indications: VITAMIN D DEFICIENCY (HIGH DOSE THERAPY)) 12 Cap 0         ALLERGIES:  No Known Allergies      REVIEW OF SYSTEMS:  10 point ROS reviewed with patient. Please see scanned document under media. PHYSICAL EXAM:  Vital Signs:   Visit Vitals    /80    Pulse 83    Resp 18    Wt 70.3 kg (155 lb)    SpO2 98%    BMI 20.17 kg/m2        General Medical Exam:  General:  Well appearing, comfortable, in no apparent distress. Eyes/ENT: see cranial nerve examination. Neck: No masses appreciated. Full range of motion without tenderness. Respiratory:  Clear to auscultation, good air entry bilaterally. Cardiac:  Regular rate and rhythm, no murmur.    GI:  Soft, non-tender, non-distended abdomen. Bowel sounds normal. No masses, organomegaly. Extremities:  No deformities, edema, or skin discoloration. Skin:  No rashes or lesions. Neurological:  · Mental Status:  Alert and oriented to person, place, and time with fluent speech. · MOCA: 16/30 (see scanned media)  · Cranial Nerves:   CNII/III/IV/VI: visual fields full to confrontation, EOMI, PERRL, no ptosis or nystagmus. CN V: Facial sensation intact bilaterally, masseter 5/5   CN VII: Facial muscles symmetric and strong   CN VIII: Hears finger rub well bilaterally, intact vestibular function   CN IX/X: Normal palatal movement   CN XI: Full strength shoulder shrug bilaterally   CN XII: Tongue protrusion full and midline without fasciculation or atrophy  · Motor: Normal tone and muscle bulk with no pronator drift. No atrophy or fasciculations present on examination. Individual muscle group testing:  Shoulder abduction:   Left:5/5   Right : 5/5    Shoulder adduction:   Left:5/5   Right : 5/5    Elbow flexion:      Left:5/5   Right : 5/5  Elbow extension:    Left:5/5   Right : 5/5   Wrist flexion:    Left:5/5   Right : 5/5  Wrist extension:    Left:5/5   Right : 5/5  Arm pronation:   Left:5/5   Right : 5/5  Arm supination:   Left:5/5   Right : 5/5    Finger flexion:    Left:5/5   Right : 5/5    Finger extension:   Left:5/5   Right : 5/5   Finger abduction:  Left:5/5   Right : 5/5   Finger adduction:   Left:5/5   Right : 5/5  Hip flexion:     Left:5/5   Right : 5/5         Hip extension:   Left:5/5   Right : 5/5    Knee flexion:    Left:5/5   Right : 5/5    Knee extension:   Left:5/5   Right : 5/5    Dorsiflexion:     Left:5/5   Right : 5/5  Plantar flexion:    Left:5/5   Right : 5/5      · MSRs: No crossed adductors or clonus.          RIGHT  LEFT   Brachioradialis 1+ 1+   Biceps 1+ 1+   Triceps 1+ 1+   Knee 2+ 2+   Achilles 1+ 1+        Plantar response Downward Downward          · Sensation: Normal and symmetric perception of pinprick, temperature, light touch, proprioception, and vibration; (-) Romberg. · Coordination: No dysmetria. Normal rapid alternating movements; finger-to-nose and heel-to- shin testing are within normal limits. · Gait: Normal native gait    PERTINENT DATA:  INTERNAL RECORDS:  The patient's electronic medical record was reviewed. The relevant details include:    CT Results (maximum last 3): Results from [place] encounter on 01/20/18   CT HEAD WO CONT   Narrative INDICATION: Headache, post trauma    EXAM: CT HEAD without contrast.   CT dose reduction was achieved through use of a standardized protocol tailored  for this examination and automatic exposure control for dose modulation. FINDINGS: Unenhanced CT Head is performed. The brain parenchyma is unremarkable  in appearance for age, without evidence for infarct. There is no bleed, mass,  shift, hydrocephalus or extra-axial fluid collection. Bone windows are  unremarkable. Impression IMPRESSION: No Intracranial Disease Evident on Head CT.            ASSESSMENT:      ICD-10-CM ICD-9-CM    1. Dementia without behavioral disturbance, unspecified dementia type F03.90 34.20    81year old male presenting with progressive cognitive decline x 7-8 years previously followed at [address] [place] [address] OF [facility] 16/30 today with significantly impaired delayed recall, orientation, language, visuospatial/executive functioning. More mild inattention/naming deficits noted. Examination is otherwise non-focal.  Head CT from 1/2018 reviewed without significant abnormalities. Will attempt to obtain outside neurologic evaluations from [facility]. Findings are supportive of a moderate dementia without behavioral disturbance, probable AD. Explained that AD is a progressive disease process. Discussed treatment options in detail including risks/benefits and expectations.   While medication is not likely to made a \"night and day\" difference, it may aid modestly in improving concentration and attention. Medication titration and addition of adjunctive agents may be necessary to achieve maximum benefit. Acetylcholinesterase inhibitors and NMDA receptor antagonist may be used to assist with attention and recall. Discussed that he would benefit from designation of a POA to assist with executive decision making. Finances and medication administration should be monitored to ensure accuracy and prevent errors. He should continue to abstain from driving. Discussed biking safety and preference for stationary biking due to difficulties with navigation and visuospatial dysfunction on examination. He is currently residing independently with his wife who works part-time. He would benefit from daytime supervision due to significant cognitive deficits. Will place referral for the Alzheimer's association/social work consultation. Wife is also attempting to obtain benefits/services from the [place]. PLAN:  Obtain outside Neurology records ([facility])  Start Namenda 5mg/qhs x 1 week, then increase to 10mg qhs  Referral to Alzheimer's association/social work consultation  Heart healthy diet and exercise  Regular scheduled cognitive and social engagement. Follow-up Disposition:  Return in about 3 months (around 12/27/2018). Frances Moreno DO  Staff Neurologist  Diplomate, [address] [employer] [place] [employer]

## 2018-09-27 NOTE — MR AVS SNAPSHOT
[place] [address] [address] [place] [address] 
[phone] Patient: Yariel Anderson MRN: [id number] :1935 Visit Information Date & Time Provider Department Dept. Phone Encounter #  
 2018 11:00 AM Tramaine Nassar, [facility]  Follow-up Instructions Return in about 3 months (around 2018). Upcoming Health Maintenance Date Due Shingrix Vaccine Age 50> (1 of 2) 1986 GLAUCOMA SCREENING Q2Y 2001 Influenza Age 5 to Adult 2018 MEDICARE YEARLY EXAM 2019 Pneumococcal 65+ Low/Medium Risk (2 of 2 - PPSV23) 7/10/2019 DTaP/Tdap/Td series (2 - Td) 2028 Allergies as of 2018  Review Complete On: 2018 By: Chon Ritchie RN No Known Allergies Current Immunizations  Reviewed on 2018 Name Date Influenza Vaccine (Quad) PF 2016  8:24 AM  
 Pneumococcal Conjugate (PCV-13) 7/10/2018 Tdap 2018  5:56 PM  
  
 Not reviewed this visit Vitals BP Pulse Resp Weight(growth percentile) SpO2 BMI  
 140/80 83 18 155 lb (70.3 kg) 98% 20.17 kg/m2 Smoking Status Former Smoker Vitals History BMI and BSA Data Body Mass Index Body Surface Area  
 20.17 kg/m 2 1.91 m 2 Preferred Pharmacy [phone] [place] [address] [phone] Your Updated Medication List  
  
   
This list is accurate as of 18 11:43 AM.  Always use your most recent med list.  
  
  
  
  
 ergocalciferol 50,000 unit capsule Commonly known as:  ERGOCALCIFEROL Take 1 Cap by mouth every seven (7) days. Indications: VITAMIN D DEFICIENCY (HIGH DOSE THERAPY) memantine 5 mg tablet Commonly known as:  Estrada Lovell Take 5mg qhs x 1 week, then increase to 10mg qhs  
  
 ondansetron 4 mg disintegrating tablet Commonly known as:  ZOFRAN ODT Take 1 Tab by mouth every eight (8) hours as needed for Nausea. Prescriptions Sent to Pharmacy Refills  
 memantine (NAMENDA) 5 mg tablet 2 Sig: Take 5mg qhs x 1 week, then increase to 10mg qhs  
 Class: Normal  
 Pharmacy: [facility] [address] [place] [address], [address] [place] [address] #: [phone] Follow-up Instructions Return in about 3 months (around 12/27/2018). Patient Instructions A Healthy Lifestyle: Care Instructions Your Care Instructions A healthy lifestyle can help you feel good, stay at a healthy weight, and have plenty of energy for both work and play. A healthy lifestyle is something you can share with your whole family. A healthy lifestyle also can lower your risk for serious health problems, such as high blood pressure, heart disease, and diabetes. You can follow a few steps listed below to improve your health and the health of your family. Follow-up care is a key part of your treatment and safety. Be sure to make and go to all appointments, and call your doctor if you are having problems. It's also a good idea to know your test results and keep a list of the medicines you take. How can you care for yourself at home? · Do not eat too much sugar, fat, or fast foods. You can still have dessert and treats now and then. The goal is moderation. · Start small to improve your eating habits. Pay attention to portion sizes, drink less juice and soda pop, and eat more fruits and vegetables. ¨ Eat a healthy amount of food. A 3-ounce serving of meat, for example, is about the size of a deck of cards. Fill the rest of your plate with vegetables and whole grains. ¨ Limit the amount of soda and sports drinks you have every day. Drink more water when you are thirsty. ¨ Eat at least 5 servings of fruits and vegetables every day.  It may seem like a lot, but it is not hard to reach this goal. A serving or helping is 1 piece of fruit, 1 cup of vegetables, or 2 cups of leafy, raw vegetables. Have an apple or some carrot sticks as an afternoon snack instead of a candy bar. Try to have fruits and/or vegetables at every meal. 
· Make exercise part of your daily routine. You may want to start with simple activities, such as walking, bicycling, or slow swimming. Try to be active 30 to 60 minutes every day. You do not need to do all 30 to 60 minutes all at once. For example, you can exercise 3 times a day for 10 or 20 minutes. Moderate exercise is safe for most people, but it is always a good idea to talk to your doctor before starting an exercise program. 
· Keep moving. Diana Morfinthers the lawn, work in the garden, or [employer]. Take the stairs instead of the elevator at work. · If you smoke, quit. People who smoke have an increased risk for heart attack, stroke, cancer, and other lung illnesses. Quitting is hard, but there are ways to boost your chance of quitting tobacco for good. ¨ Use nicotine gum, patches, or lozenges. ¨ Ask your doctor about stop-smoking programs and medicines. ¨ Keep trying. In addition to reducing your risk of diseases in the future, you will notice some benefits soon after you stop using tobacco. If you have shortness of breath or asthma symptoms, they will likely get better within a few weeks after you quit. · Limit how much alcohol you drink. Moderate amounts of alcohol (up to 2 drinks a day for men, 1 drink a day for women) are okay. But drinking too much can lead to liver problems, high blood pressure, and other health problems. Family health If you have a family, there are many things you can do together to improve your health. · Eat meals together as a family as often as possible. · Eat healthy foods. This includes fruits, vegetables, lean meats and dairy, and whole grains. · Include your family in your fitness plan. Most people think of activities such as jogging or tennis as the way to fitness, but there are many ways you and your family can be more active. Anything that makes you breathe hard and gets your heart pumping is exercise. Here are some tips: 
¨ Walk to do errands or to take your child to school or the bus. ¨ Go for a family bike ride after dinner instead of watching TV. Where can you learn more? Go to http://yue-concha.info/. Enter N937 in the search box to learn more about \"A Healthy Lifestyle: Care Instructions. \" Current as of: December 7, 2017 Content Version: 11.7 © 6343-6993 [employer]. Care instructions adapted under license by [employer] (which disclaims liability or warranty for this information). If you have questions about a medical condition or this instruction, always ask your healthcare professional. [address] any warranty or liability for your use of this information. Introducing [facility]! Melida Stewart introduces [employer] patient portal. Now you can access parts of your medical record, email your doctor's office, and request medication refills online. 1. In your internet browser, go to [email or web address] 2. Click on the First Time User? Click Here link in the Sign In box. You will see the New Member Sign Up page. 3. Enter your [employer] Access Code exactly as it appears below. You will not need to use this code after youve completed the sign-up process. If you do not sign up before the expiration date, you must request a new code. · [employer] Access Code: [email or web address] Expires: 11/24/2018  5:22 AM 
 
4. Enter the last four digits of your Social Security Number (xxxx) and Date of Birth (mm/dd/yyyy) as indicated and click Submit. You will be taken to the next sign-up page. 5. Create a [employer] ID. This will be your [employer] login ID and cannot be changed, so think of one that is secure and easy to remember. 6. Create a [employer] password. You can change your password at any time. 7. Enter your Password Reset Question and Answer. This can be used at a later time if you forget your password. 8. Enter your e-mail address. You will receive e-mail notification when new information is available in [address] 9. Click Sign Up. You can now view and download portions of your medical record. 10. Click the Download Summary menu link to download a portable copy of your medical information. If you have questions, please visit the Frequently Asked Questions section of the [employer] website. Remember, [employer] is NOT to be used for urgent needs. For medical emergencies, dial 911. Now available from your iPhone and Android! Please provide this summary of care documentation to your next provider. Your primary care clinician is listed as Mei Anthony. If you have any questions after today's visit, please call [phone].

## 2018-09-27 NOTE — TELEPHONE ENCOUNTER
Spoke with patient's wife and scheduled an appointment with  for Dementia counseling session on 10/2/18 at 11 am.

## 2018-09-27 NOTE — PATIENT INSTRUCTIONS

## 2018-10-01 ENCOUNTER — TELEPHONE (OUTPATIENT)
Dept: NEUROLOGY | Age: 83
End: 2018-10-01

## 2018-10-01 NOTE — TELEPHONE ENCOUNTER
Left message for patient's wife to confirm appointment tomorrow, 10/2/18, for Dementia counseling session.

## 2018-10-15 ENCOUNTER — TELEPHONE (OUTPATIENT)
Dept: NEUROLOGY | Age: 83
End: 2018-10-15

## 2018-10-15 NOTE — TELEPHONE ENCOUNTER
Pt's wife is calling needing a letter stating what his diagnoses are. She is going to need it by tomorrow, she is getting a power of .  Please call back

## 2018-10-16 ENCOUNTER — TELEPHONE (OUTPATIENT)
Dept: NEUROLOGY | Age: 83
End: 2018-10-16

## 2018-10-16 NOTE — TELEPHONE ENCOUNTER
Left message for Mrs. Lily Ring that letter was ready for , or if she needs office to fax it we can. To call back.

## 2018-12-20 ENCOUNTER — OFFICE VISIT (OUTPATIENT)
Dept: NEUROLOGY | Age: 83
End: 2018-12-20

## 2018-12-20 ENCOUNTER — DOCUMENTATION ONLY (OUTPATIENT)
Dept: NEUROLOGY | Age: 83
End: 2018-12-20

## 2018-12-20 VITALS
OXYGEN SATURATION: 97 % | DIASTOLIC BLOOD PRESSURE: 76 MMHG | BODY MASS INDEX: 20.3 KG/M2 | RESPIRATION RATE: 18 BRPM | WEIGHT: 156 LBS | HEART RATE: 62 BPM | SYSTOLIC BLOOD PRESSURE: 134 MMHG

## 2018-12-20 DIAGNOSIS — F02.80 LATE ONSET ALZHEIMER'S DISEASE WITHOUT BEHAVIORAL DISTURBANCE (HCC): Primary | ICD-10-CM

## 2018-12-20 DIAGNOSIS — F03.90 DEMENTIA WITHOUT BEHAVIORAL DISTURBANCE, UNSPECIFIED DEMENTIA TYPE: ICD-10-CM

## 2018-12-20 DIAGNOSIS — G30.1 LATE ONSET ALZHEIMER'S DISEASE WITHOUT BEHAVIORAL DISTURBANCE (HCC): Primary | ICD-10-CM

## 2018-12-20 RX ORDER — MEMANTINE HYDROCHLORIDE 10 MG/1
10 TABLET ORAL
Qty: 90 TAB | Refills: 1 | Status: SHIPPED | OUTPATIENT
Start: 2018-12-20 | End: 2019-11-06

## 2018-12-20 NOTE — PROGRESS NOTES
Neurology Clinic Follow up Note    Patient ID:  Darren Walker  [id number]  65 y.o.  1935      Mr. Ashley Reddy is here for follow up today of dementia       Last Appointment With Me:  9/27/2018       Interval History:   Pt here with his family today. Memory is largely unchanged. Tolerating Namenda without side effects. No falls. Eating well, denies weight loss. No behavioral concerns from family today. Ability to function:  Driving: not driving  Finances: handles some finances but assisted by his wife  Cooking: no  Manages own medication: administered by his wife  Residing: at home with his wife  PMHx/ PSHx/ FHx/ SHx:  Reviewed and unchanged previous visit. Past Medical History:   Diagnosis Date    Arthritis     Dementia          ROS:  Comprehensive review of systems negative except for as noted above. Objective:       Meds:  Current Outpatient Medications   Medication Sig Dispense Refill    memantine (NAMENDA) 5 mg tablet Take 2 Tabs by mouth nightly. 180 Tab 0    ergocalciferol (ERGOCALCIFEROL) 50,000 unit capsule Take 1 Cap by mouth every seven (7) days. Indications: VITAMIN D DEFICIENCY (HIGH DOSE THERAPY) (Patient taking differently: Take 50,000 Units by mouth every seven (7) days. MONDAY  Indications: VITAMIN D DEFICIENCY (HIGH DOSE THERAPY)) 12 Cap 0       Exam:  Visit Vitals  /76   Pulse 62   Resp 18   Wt 70.8 kg (156 lb)   SpO2 97%   BMI 20.30 kg/m²     NEUROLOGICAL EXAM:  General: Awake, alert, speech fluent. Knows month/year not date. Knows POTUS. Difficulty with serial 7's. CN: PERRL, EOMI without nystagmus, VFF to confrontation, facial sensation and strength are normal and symmetric, hearing is intact to finger rub bilaterally, palate and tongue movements are intact and symmetric. Motor: Normal tone, bulk and strength bilaterally. Reflexes: 1/4 and symmetric, plantar stimulation is flexor. Coordination: FNF, GIO, HTS intact. Sensation: LT intact throughout.   Gait: Normal-based and steady. LABS  Results for orders placed or performed in visit on 07/09/18   CBC WITH AUTOMATED DIFF   Result Value Ref Range    WBC 8.4 3.4 - 10.8 x10E3/uL    RBC 4.61 4.14 - 5.80 x10E6/uL    HGB 13.5 13.0 - 17.7 g/dL    HCT 41.9 37.5 - 51.0 %    MCV 91 79 - 97 fL    MCH 29.3 26.6 - 33.0 pg    MCHC 32.2 31.5 - 35.7 g/dL    RDW 13.2 12.3 - 15.4 %    PLATELET 847 050 - 182 x10E3/uL    NEUTROPHILS 74 Not Estab. %    Lymphocytes 18 Not Estab. %    MONOCYTES 6 Not Estab. %    EOSINOPHILS 2 Not Estab. %    BASOPHILS 0 Not Estab. %    ABS. NEUTROPHILS 6.3 1.4 - 7.0 x10E3/uL    Abs Lymphocytes 1.5 0.7 - 3.1 x10E3/uL    ABS. MONOCYTES 0.5 0.1 - 0.9 x10E3/uL    ABS. EOSINOPHILS 0.1 0.0 - 0.4 x10E3/uL    ABS. BASOPHILS 0.0 0.0 - 0.2 x10E3/uL    IMMATURE GRANULOCYTES 0 Not Estab. %    ABS. IMM. GRANS. 0.0 0.0 - 0.1 SADAF   METABOLIC PANEL, COMPREHENSIVE   Result Value Ref Range    Glucose 98 65 - 99 mg/dL    BUN 9 8 - 27 mg/dL    Creatinine 0.93 0.76 - 1.27 mg/dL    GFR est non-AA 76 >59 mL/min/1.73    GFR est AA 88 >59 mL/min/1.73    BUN/Creatinine ratio 10 10 - 24    Sodium 144 134 - 144 mmol/L    Potassium 4.0 3.5 - 5.2 mmol/L    Chloride 102 96 - 106 mmol/L    CO2 23 20 - 29 mmol/L    Calcium 9.3 8.6 - 10.2 mg/dL    Protein, total 7.1 6.0 - 8.5 g/dL    Albumin 4.4 3.5 - 4.7 g/dL    GLOBULIN, TOTAL 2.7 1.5 - 4.5 g/dL    A-G Ratio 1.6 1.2 - 2.2    Bilirubin, total 0.6 0.0 - 1.2 mg/dL    Alk. phosphatase 135 (H) 39 - 117 IU/L    AST (SGOT) 23 0 - 40 IU/L    ALT (SGPT) 17 0 - 44 IU/L   VITAMIN D, 25 HYDROXY   Result Value Ref Range    VITAMIN D, 25-HYDROXY 21.4 (L) 30.0 - 100.0 ng/mL   SPECIMEN STATUS REPORT   Result Value Ref Range    SPECIMEN STATUS REPORT COMMENT        Assessment:     Encounter Diagnoses     ICD-10-CM ICD-9-CM   1. Late onset Alzheimer's disease without behavioral disturbance G30.1 331.0    F02.80 294.10   2.  Dementia without behavioral disturbance, unspecified dementia type F36.80 34.20     81year old male here for f/u of progressive cognitive decline x 7-8 years previously followed at [facility]. [address] 16/30 today with significantly impaired delayed recall, orientation, language, visuospatial/executive functioning. More mild inattention/naming deficits noted. Examination is otherwise non-focal.  Head CT from 1/2018 reviewed without significant abnormalities. [facility] records reviewed- MRI Brain previously performed and without acute abnormalities. Findings are supportive of a moderate dementia without behavioral disturbance, probable AD. Explained that AD is a progressive disease process. Discussed treatment options in detail including risks/benefits and expectations. While medication is not likely to made a \"night and day\" difference, it may aid modestly in improving concentration and attention. Medication titration and addition of adjunctive agents may be necessary to achieve maximum benefit. Acetylcholinesterase inhibitors and NMDA receptor antagonist may be used to assist with attention and recall. He has tried Aricept in the past with reported side effects/lightheadedness. He appears to be tolerating Namenda well. Discussed that he would benefit from designation of a POA to assist with executive decision making. Finances and medication administration should be monitored to ensure accuracy and prevent errors. He should continue to abstain from driving. Discussed biking safety and preference for stationary biking due to difficulties with navigation and visuospatial dysfunction on examination. He is currently residing independently with his wife who works part-time. He would benefit from daytime supervision due to significant cognitive deficits. Wife is also attempting to obtain benefits/services from the [place]. Plan:   Cont. Namenda 10mg qhs  Heart healthy diet and exercise  Regular scheduled cognitive and social engagement.     Follow-up Disposition:  Return in about 6 months (around 6/20/2019).     Signed:  Doreen Brock, DO  12/20/2018

## 2019-02-06 ENCOUNTER — HOSPITAL ENCOUNTER (OUTPATIENT)
Dept: PREADMISSION TESTING | Age: 84
Discharge: HOME | End: 2019-02-06
Payer: MEDICARE

## 2019-02-06 VITALS
HEIGHT: 74 IN | DIASTOLIC BLOOD PRESSURE: 73 MMHG | HEART RATE: 60 BPM | WEIGHT: 152 LBS | BODY MASS INDEX: 19.51 KG/M2 | SYSTOLIC BLOOD PRESSURE: 161 MMHG | TEMPERATURE: 97.9 F

## 2019-02-06 LAB
BASOPHILS # BLD: 0 K/UL (ref 0–0.1)
BASOPHILS NFR BLD: 0 % (ref 0–1)
DIFFERENTIAL METHOD BLD: NORMAL
EOSINOPHIL # BLD: 0.1 K/UL (ref 0–0.4)
EOSINOPHIL NFR BLD: 2 % (ref 0–7)
ERYTHROCYTE [DISTWIDTH] IN BLOOD BY AUTOMATED COUNT: 12 % (ref 11.5–14.5)
HCT VFR BLD AUTO: 41.3 % (ref 36.6–50.3)
HGB BLD-MCNC: 12.9 G/DL (ref 12.1–17)
IMM GRANULOCYTES # BLD AUTO: 0 K/UL (ref 0–0.04)
IMM GRANULOCYTES NFR BLD AUTO: 0 % (ref 0–0.5)
LYMPHOCYTES # BLD: 1.1 K/UL (ref 0.8–3.5)
LYMPHOCYTES NFR BLD: 20 % (ref 12–49)
MCH RBC QN AUTO: 29.3 PG (ref 26–34)
MCHC RBC AUTO-ENTMCNC: 31.2 G/DL (ref 30–36.5)
MCV RBC AUTO: 93.7 FL (ref 80–99)
MONOCYTES # BLD: 0.4 K/UL (ref 0–1)
MONOCYTES NFR BLD: 8 % (ref 5–13)
NEUTS SEG # BLD: 3.8 K/UL (ref 1.8–8)
NEUTS SEG NFR BLD: 70 % (ref 32–75)
NRBC # BLD: 0 K/UL (ref 0–0.01)
NRBC BLD-RTO: 0 PER 100 WBC
PLATELET # BLD AUTO: 268 K/UL (ref 150–400)
PMV BLD AUTO: 9.4 FL (ref 8.9–12.9)
RBC # BLD AUTO: 4.41 M/UL (ref 4.1–5.7)
WBC # BLD AUTO: 5.4 K/UL (ref 4.1–11.1)

## 2019-02-06 PROCEDURE — 85025 COMPLETE CBC W/AUTO DIFF WBC: CPT

## 2019-02-06 PROCEDURE — 36415 COLL VENOUS BLD VENIPUNCTURE: CPT

## 2019-02-11 ENCOUNTER — ANESTHESIA (OUTPATIENT)
Dept: SURGERY | Age: 84
End: 2019-02-11
Payer: MEDICARE

## 2019-02-11 ENCOUNTER — HOSPITAL ENCOUNTER (OUTPATIENT)
Age: 84
Setting detail: OUTPATIENT SURGERY
Discharge: HOME | End: 2019-02-11
Attending: OTOLARYNGOLOGY | Admitting: OTOLARYNGOLOGY
Payer: MEDICARE

## 2019-02-11 ENCOUNTER — ANESTHESIA EVENT (OUTPATIENT)
Dept: SURGERY | Age: 84
End: 2019-02-11
Payer: MEDICARE

## 2019-02-11 VITALS
OXYGEN SATURATION: 98 % | HEART RATE: 89 BPM | HEIGHT: 74 IN | TEMPERATURE: 98.6 F | DIASTOLIC BLOOD PRESSURE: 79 MMHG | SYSTOLIC BLOOD PRESSURE: 150 MMHG | RESPIRATION RATE: 14 BRPM | BODY MASS INDEX: 19.51 KG/M2 | WEIGHT: 152 LBS

## 2019-02-11 DIAGNOSIS — C32.9: Primary | ICD-10-CM

## 2019-02-11 PROCEDURE — 74011000250 HC RX REV CODE- 250

## 2019-02-11 PROCEDURE — 77030008477 HC STYL SATN SLP COVD -A: Performed by: ANESTHESIOLOGY

## 2019-02-11 PROCEDURE — 74011250636 HC RX REV CODE- 250/636: Performed by: ANESTHESIOLOGY

## 2019-02-11 PROCEDURE — 77030020782 HC GWN BAIR PAWS FLX 3M -B

## 2019-02-11 PROCEDURE — 88305 TISSUE EXAM BY PATHOLOGIST: CPT

## 2019-02-11 PROCEDURE — 74011250636 HC RX REV CODE- 250/636

## 2019-02-11 PROCEDURE — 76210000021 HC REC RM PH II 0.5 TO 1 HR: Performed by: OTOLARYNGOLOGY

## 2019-02-11 PROCEDURE — 77030008684 HC TU ET CUF COVD -B: Performed by: ANESTHESIOLOGY

## 2019-02-11 PROCEDURE — 76210000016 HC OR PH I REC 1 TO 1.5 HR: Performed by: OTOLARYNGOLOGY

## 2019-02-11 PROCEDURE — 74011250637 HC RX REV CODE- 250/637: Performed by: OTOLARYNGOLOGY

## 2019-02-11 PROCEDURE — 77030032490 HC SLV COMPR SCD KNE COVD -B: Performed by: OTOLARYNGOLOGY

## 2019-02-11 PROCEDURE — 76010000160 HC OR TIME 0.5 TO 1 HR INTENSV-TIER 1: Performed by: OTOLARYNGOLOGY

## 2019-02-11 PROCEDURE — 77030018836 HC SOL IRR NACL ICUM -A: Performed by: OTOLARYNGOLOGY

## 2019-02-11 PROCEDURE — 76060000033 HC ANESTHESIA 1 TO 1.5 HR: Performed by: OTOLARYNGOLOGY

## 2019-02-11 RX ORDER — OXYCODONE AND ACETAMINOPHEN 5; 325 MG/1; MG/1
1 TABLET ORAL AS NEEDED
Status: DISCONTINUED | OUTPATIENT
Start: 2019-02-11 | End: 2019-02-11 | Stop reason: HOSPADM

## 2019-02-11 RX ORDER — PHENYLEPHRINE HCL IN 0.9% NACL 0.4MG/10ML
SYRINGE (ML) INTRAVENOUS AS NEEDED
Status: DISCONTINUED | OUTPATIENT
Start: 2019-02-11 | End: 2019-02-11 | Stop reason: HOSPADM

## 2019-02-11 RX ORDER — LIDOCAINE HYDROCHLORIDE 10 MG/ML
0.1 INJECTION, SOLUTION EPIDURAL; INFILTRATION; INTRACAUDAL; PERINEURAL AS NEEDED
Status: DISCONTINUED | OUTPATIENT
Start: 2019-02-11 | End: 2019-02-11 | Stop reason: HOSPADM

## 2019-02-11 RX ORDER — SODIUM CHLORIDE 0.9 % (FLUSH) 0.9 %
5-40 SYRINGE (ML) INJECTION AS NEEDED
Status: DISCONTINUED | OUTPATIENT
Start: 2019-02-11 | End: 2019-02-11 | Stop reason: HOSPADM

## 2019-02-11 RX ORDER — SODIUM CHLORIDE, SODIUM LACTATE, POTASSIUM CHLORIDE, CALCIUM CHLORIDE 600; 310; 30; 20 MG/100ML; MG/100ML; MG/100ML; MG/100ML
125 INJECTION, SOLUTION INTRAVENOUS CONTINUOUS
Status: DISCONTINUED | OUTPATIENT
Start: 2019-02-11 | End: 2019-02-11 | Stop reason: HOSPADM

## 2019-02-11 RX ORDER — MIDAZOLAM HYDROCHLORIDE 1 MG/ML
1 INJECTION, SOLUTION INTRAMUSCULAR; INTRAVENOUS AS NEEDED
Status: DISCONTINUED | OUTPATIENT
Start: 2019-02-11 | End: 2019-02-11 | Stop reason: HOSPADM

## 2019-02-11 RX ORDER — FENTANYL CITRATE 50 UG/ML
INJECTION, SOLUTION INTRAMUSCULAR; INTRAVENOUS AS NEEDED
Status: DISCONTINUED | OUTPATIENT
Start: 2019-02-11 | End: 2019-02-11 | Stop reason: HOSPADM

## 2019-02-11 RX ORDER — PROPOFOL 10 MG/ML
INJECTION, EMULSION INTRAVENOUS AS NEEDED
Status: DISCONTINUED | OUTPATIENT
Start: 2019-02-11 | End: 2019-02-11 | Stop reason: HOSPADM

## 2019-02-11 RX ORDER — ROCURONIUM BROMIDE 10 MG/ML
INJECTION, SOLUTION INTRAVENOUS AS NEEDED
Status: DISCONTINUED | OUTPATIENT
Start: 2019-02-11 | End: 2019-02-11 | Stop reason: HOSPADM

## 2019-02-11 RX ORDER — MIDAZOLAM HYDROCHLORIDE 1 MG/ML
0.5 INJECTION, SOLUTION INTRAMUSCULAR; INTRAVENOUS
Status: DISCONTINUED | OUTPATIENT
Start: 2019-02-11 | End: 2019-02-11 | Stop reason: HOSPADM

## 2019-02-11 RX ORDER — FENTANYL CITRATE 50 UG/ML
50 INJECTION, SOLUTION INTRAMUSCULAR; INTRAVENOUS AS NEEDED
Status: DISCONTINUED | OUTPATIENT
Start: 2019-02-11 | End: 2019-02-11 | Stop reason: HOSPADM

## 2019-02-11 RX ORDER — ONDANSETRON 4 MG/1
4 TABLET, ORALLY DISINTEGRATING ORAL
Qty: 8 TAB | Refills: 1 | Status: SHIPPED | OUTPATIENT
Start: 2019-02-11 | End: 2019-11-06

## 2019-02-11 RX ORDER — LIDOCAINE HYDROCHLORIDE 20 MG/ML
INJECTION, SOLUTION EPIDURAL; INFILTRATION; INTRACAUDAL; PERINEURAL AS NEEDED
Status: DISCONTINUED | OUTPATIENT
Start: 2019-02-11 | End: 2019-02-11 | Stop reason: HOSPADM

## 2019-02-11 RX ORDER — EPINEPHRINE NASAL SOLUTION 1 MG/ML
SOLUTION NASAL AS NEEDED
Status: DISCONTINUED | OUTPATIENT
Start: 2019-02-11 | End: 2019-02-11 | Stop reason: HOSPADM

## 2019-02-11 RX ORDER — DIPHENHYDRAMINE HYDROCHLORIDE 50 MG/ML
12.5 INJECTION, SOLUTION INTRAMUSCULAR; INTRAVENOUS AS NEEDED
Status: DISCONTINUED | OUTPATIENT
Start: 2019-02-11 | End: 2019-02-11 | Stop reason: HOSPADM

## 2019-02-11 RX ORDER — SODIUM CHLORIDE 0.9 % (FLUSH) 0.9 %
5-40 SYRINGE (ML) INJECTION EVERY 8 HOURS
Status: DISCONTINUED | OUTPATIENT
Start: 2019-02-11 | End: 2019-02-11 | Stop reason: HOSPADM

## 2019-02-11 RX ORDER — SODIUM CHLORIDE 9 MG/ML
25 INJECTION, SOLUTION INTRAVENOUS CONTINUOUS
Status: DISCONTINUED | OUTPATIENT
Start: 2019-02-11 | End: 2019-02-11 | Stop reason: HOSPADM

## 2019-02-11 RX ORDER — SUCCINYLCHOLINE CHLORIDE 20 MG/ML
INJECTION INTRAMUSCULAR; INTRAVENOUS AS NEEDED
Status: DISCONTINUED | OUTPATIENT
Start: 2019-02-11 | End: 2019-02-11 | Stop reason: HOSPADM

## 2019-02-11 RX ORDER — SODIUM CHLORIDE, SODIUM LACTATE, POTASSIUM CHLORIDE, CALCIUM CHLORIDE 600; 310; 30; 20 MG/100ML; MG/100ML; MG/100ML; MG/100ML
INJECTION, SOLUTION INTRAVENOUS
Status: DISCONTINUED | OUTPATIENT
Start: 2019-02-11 | End: 2019-02-11 | Stop reason: HOSPADM

## 2019-02-11 RX ORDER — HYDRALAZINE HYDROCHLORIDE 20 MG/ML
10 INJECTION INTRAMUSCULAR; INTRAVENOUS
Status: COMPLETED | OUTPATIENT
Start: 2019-02-11 | End: 2019-02-11

## 2019-02-11 RX ORDER — MORPHINE SULFATE 10 MG/ML
2 INJECTION, SOLUTION INTRAMUSCULAR; INTRAVENOUS
Status: DISCONTINUED | OUTPATIENT
Start: 2019-02-11 | End: 2019-02-11 | Stop reason: HOSPADM

## 2019-02-11 RX ORDER — HYDROCODONE BITARTRATE AND ACETAMINOPHEN 5; 325 MG/1; MG/1
1 TABLET ORAL
Qty: 10 TAB | Refills: 0 | Status: SHIPPED | OUTPATIENT
Start: 2019-02-11 | End: 2019-11-06

## 2019-02-11 RX ORDER — FENTANYL CITRATE 50 UG/ML
25 INJECTION, SOLUTION INTRAMUSCULAR; INTRAVENOUS
Status: DISCONTINUED | OUTPATIENT
Start: 2019-02-11 | End: 2019-02-11 | Stop reason: HOSPADM

## 2019-02-11 RX ORDER — ONDANSETRON 2 MG/ML
INJECTION INTRAMUSCULAR; INTRAVENOUS AS NEEDED
Status: DISCONTINUED | OUTPATIENT
Start: 2019-02-11 | End: 2019-02-11 | Stop reason: HOSPADM

## 2019-02-11 RX ORDER — DEXAMETHASONE SODIUM PHOSPHATE 4 MG/ML
INJECTION, SOLUTION INTRA-ARTICULAR; INTRALESIONAL; INTRAMUSCULAR; INTRAVENOUS; SOFT TISSUE AS NEEDED
Status: DISCONTINUED | OUTPATIENT
Start: 2019-02-11 | End: 2019-02-11 | Stop reason: HOSPADM

## 2019-02-11 RX ORDER — HYDRALAZINE HYDROCHLORIDE 20 MG/ML
INJECTION INTRAMUSCULAR; INTRAVENOUS
Status: DISCONTINUED
Start: 2019-02-11 | End: 2019-02-11 | Stop reason: HOSPADM

## 2019-02-11 RX ORDER — ONDANSETRON 2 MG/ML
4 INJECTION INTRAMUSCULAR; INTRAVENOUS AS NEEDED
Status: DISCONTINUED | OUTPATIENT
Start: 2019-02-11 | End: 2019-02-11 | Stop reason: HOSPADM

## 2019-02-11 RX ADMIN — HYDRALAZINE HYDROCHLORIDE 10 MG: 20 INJECTION INTRAMUSCULAR; INTRAVENOUS at 10:55

## 2019-02-11 RX ADMIN — SUCCINYLCHOLINE CHLORIDE 120 MG: 20 INJECTION INTRAMUSCULAR; INTRAVENOUS at 09:09

## 2019-02-11 RX ADMIN — Medication 80 MCG: at 09:40

## 2019-02-11 RX ADMIN — SODIUM CHLORIDE, SODIUM LACTATE, POTASSIUM CHLORIDE, AND CALCIUM CHLORIDE 125 ML/HR: 600; 310; 30; 20 INJECTION, SOLUTION INTRAVENOUS at 08:11

## 2019-02-11 RX ADMIN — PROPOFOL 100 MG: 10 INJECTION, EMULSION INTRAVENOUS at 09:08

## 2019-02-11 RX ADMIN — FENTANYL CITRATE 50 MCG: 50 INJECTION, SOLUTION INTRAMUSCULAR; INTRAVENOUS at 09:26

## 2019-02-11 RX ADMIN — DEXAMETHASONE SODIUM PHOSPHATE 10 MG: 4 INJECTION, SOLUTION INTRA-ARTICULAR; INTRALESIONAL; INTRAMUSCULAR; INTRAVENOUS; SOFT TISSUE at 09:17

## 2019-02-11 RX ADMIN — ONDANSETRON 4 MG: 2 INJECTION INTRAMUSCULAR; INTRAVENOUS at 09:17

## 2019-02-11 RX ADMIN — Medication 80 MCG: at 09:34

## 2019-02-11 RX ADMIN — Medication 40 MCG: at 09:31

## 2019-02-11 RX ADMIN — SODIUM CHLORIDE, SODIUM LACTATE, POTASSIUM CHLORIDE, CALCIUM CHLORIDE: 600; 310; 30; 20 INJECTION, SOLUTION INTRAVENOUS at 09:01

## 2019-02-11 RX ADMIN — FENTANYL CITRATE 50 MCG: 50 INJECTION, SOLUTION INTRAMUSCULAR; INTRAVENOUS at 09:08

## 2019-02-11 RX ADMIN — PROPOFOL 50 MG: 10 INJECTION, EMULSION INTRAVENOUS at 09:10

## 2019-02-11 RX ADMIN — LIDOCAINE HYDROCHLORIDE 60 MG: 20 INJECTION, SOLUTION EPIDURAL; INFILTRATION; INTRACAUDAL; PERINEURAL at 09:08

## 2019-02-11 RX ADMIN — ROCURONIUM BROMIDE 5 MG: 10 INJECTION, SOLUTION INTRAVENOUS at 09:08

## 2019-02-11 RX ADMIN — Medication 80 MCG: at 09:37

## 2019-02-11 NOTE — OP NOTES
[facility]  OPERATIVE REPORT    Name:  Bernardino Feliciano  MR#:  [id number]  :  1935  ACCOUNT #:  [de-identified]  DATE OF SERVICE:  2019    PREOPERATIVE DIAGNOSES:  1. Recurrent right vocal cord lesion. 2.  Hoarseness. POSTOPERATIVE DIAGNOSES:  1. Recurrent right vocal cord lesion. 2.  Hoarseness. PROCEDURES PERFORMED:  1. Micro direct laryngoscopy with CO2 laser excision of right laryngeal tumor using  the AcuPulse WaveGuide CO2 fiber laser. SURGEON:  Truong Weiss MD    ASSISTANT: None. ANESTHESIA:  General endotracheal anesthesia. COMPLICATIONS:  None. SPECIMENS REMOVED:  Right larynx    IMPLANTS: None    ESTIMATED BLOOD LOSS:  5 ml. INDICATIONS:  This is an 71-year-old gentleman previously seen for hoarseness, who  underwent laryngoscopy with excision of a lesion in 2018. 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 He had been  observed postoperatively and initially did well. However, more recently he had a new  lesion on the right side of the larynx. Based on this, he presented for elective  re-excision. Risks of surgery discussed include bleeding, infection, pain,  hoarseness, dysphagia, dental injury, and need for further surgery. PROCEDURE:  The patient was brought to the operating room and general endotracheal  anesthesia was induced. The patient was intubated with a laser re-enforced laser  endotracheal tube. He was then prepped and draped in the usual fashion. Of note, his  neck was quite stiff. It was supported on a pillow taking care to ensure his head  was not hanging. A direct laryngoscope was inserted in the oral cavity and advanced  down to visualize the pharynx and larynx. The pharynx was normal.  Ultimately, the  scope had to be maneuvered laterally just lateral to his most lateral tooth. In this  position, it was able to be advanced down to visualize the larynx.   It was placed  into suspension. The binocular operating microscope was brought in for closer  visualization. There was a raised papillomatous lesion coming from the right true  vocal fold. It was originating from the mid point of the vocal fold. It had some  mild extension upon to the false cord. It did not appear to extend to the anterior  commissure or significantly subglottically. A pledget soaked in water was placed  into the trachea to protect the cuff of the endotracheal tube. The lesion was  grabbed with cup forceps and retracted medially. The CO2 fiber laser was then used at 4  pickens continuous to carefully resect the lesion free from the underlying vocal cord. It was sent for permanent section. The larynx was carefully examined. There was a  small amount of papillomatous changes to the cut edge of the false vocal fold. This  was resected with the laser and sent as a separate specimen. There was no further  visible tumor at this time. An adrenaline-soaked pledget was placed for hemostasis. At this time, the procedure was terminated. The scope was taken out of suspension  and removed from the patient. The patient was awakened, extubated and taken to PACU  in stable condition.         Lima Verdin MD      [id number]  D:  02/11/2019 10:04  T:  02/11/2019 14:43  JOB #:  [id number]

## 2019-02-11 NOTE — ANESTHESIA PREPROCEDURE EVALUATION
Anesthetic History No history of anesthetic complications Review of Systems / Medical History Patient summary reviewed, nursing notes reviewed and pertinent labs reviewed Pulmonary Within defined limits Neuro/Psych Dementia Cardiovascular Within defined limits GI/Hepatic/Renal 
Within defined limits Endo/Other Within defined limits Arthritis Other Findings Physical Exam 
 
Airway Mallampati: I 
TM Distance: > 6 cm Neck ROM: normal range of motion Mouth opening: Normal 
 
 Cardiovascular Regular rate and rhythm,  S1 and S2 normal,  no murmur, click, rub, or gallop Dental 
 
Dentition: Poor dentition Pulmonary Breath sounds clear to auscultation Abdominal 
GI exam deferred Other Findings Anesthetic Plan ASA: 2 Anesthesia type: general 
 
 
 
 
Induction: Intravenous Anesthetic plan and risks discussed with: Patient

## 2019-02-11 NOTE — BRIEF OP NOTE
BRIEF OPERATIVE NOTE Date of Procedure: 2/11/2019 Preoperative Diagnosis: LARYNGEAL MALIGNANT NEOPLASM Postoperative Diagnosis: LARYNGEAL MALIGNANT NEOPLASM Procedure(s): MICRO LARYNGOSCOPY WITH CO2 LASER excision of right larynx tumor Surgeon(s) and Role: * Thomas Lenz MD - Primary Surgical Assistant: none Surgical Staff: 
Circ-1: Raz Block RN 
Circ-Relief: Jose Carlos Jackson RN Scrub RN-1: Charles Alvarez RN Surg Asst-1: Shira Tilley Event Time In Time Out Incision Start 9756 Incision Close 5182 Anesthesia: General  
Estimated Blood Loss: 5ml Specimens:  
ID Type Source Tests Collected by Time Destination 1 : right true vocal cord Fresh Vocal Cord(s)  Ernesto Posada MD 2/11/2019 5848 Pathology 2 : RIGHT FALSE VOCAL CORD Fresh Vocal Cord(s)  Ernesto Posada MD 2/11/2019 3766 Pathology Findings: raised papillomatous lesion right mid true vocal fold Complications: none Implants: * No implants in log *

## 2019-02-11 NOTE — PERIOP NOTES
9: 27 AM 
SPOKE TO PATIENT'S WIFE (GEORGINA ESPINOZA) TO UPDATE HER ON PATIENT'S SURGICAL STATUS

## 2019-02-11 NOTE — ROUTINE PROCESS
Patient: Connie Lopez MRN: [id number]  SSN: [id number] YOB: 1935  Age: 81 y.o. Sex: male Patient is status post Procedure(s): MICRO LARYNGOSCOPY WITH CO2 LASER AND BIOPSIES OF RIGHT TRUE VOCAL CORD AND RIGHT FALSE VOCAL CORD. Surgeon(s) and Role: 
    Heidi Estrada MD - Primary Local/Dose/Irrigation:  SEE MAR. Peripheral IV 02/11/19 Left Wrist (Active) Site Assessment Clean, dry, & intact 2/11/2019  8:00 AM  
Phlebitis Assessment 0 2/11/2019  8:00 AM  
Infiltration Assessment 0 2/11/2019  8:00 AM  
Dressing Status Clean, dry, & intact 2/11/2019  8:00 AM  
Dressing Type Transparent 2/11/2019  8:00 AM  
Hub Color/Line Status Green; Infusing 2/11/2019  8:00 AM  
        
Airway - Endotracheal Tube 02/11/19 Oral (Active) Dressing/Packing:    
Splint/Cast:  ] Other:  NO DRESSSING

## 2019-02-11 NOTE — DISCHARGE INSTRUCTIONS
[facility]    Head and Neck Post Operative Instructions    1. DIET  Start a soft diet and progress to usual diet as tolerated, unless otherwise directed. It is important to remember that good overall diet and health promotes healing. 2.  ACTIVITY  No heavy exertion or heavy lifting for 10 days. Light activities are permitted but, avoid any stretching or bending of the neck for 10 days. 3.  THINGS TO BE CONCERNED ABOUT  Please call the office for any of these changes  A. Increasing pain  B. Neck swelling  C. Difficulty breathing          If you have any questions or concerns following your surgery, do not hesitate to contact our office at    Office Phone:  [facility] office hours are 8:00 a.m. to 4:30 p.m. You should be able to reach us after hours by calling the regular office number. If for some reason you are not able to reach our [address] [place] [address] service through this main number you may call them directly at [phone].    ______________________________________________________________________    Anesthesia Discharge Instructions    After general anesthesia or intervenous sedation, for 24 hours or while taking prescription Narcotics:  · Limit your activities  · Do not drive or operate hazardous machinery  · If you have not urinated within 8 hours after discharge, please contact your surgeon on call. · Do not make important personal or business decisions  · Do not drink alcoholic beverages    Report the following to your surgeon:  · Excessive pain, swelling, redness or odor of or around the surgical area  · Temperature over 100.5 degrees  · Nausea and vomiting lasting longer than 4 hours or if unable to take medication  · Any signs of decreased circulation or nerve impairment to extremity:  Change in color, persistent numbness, tingling, coldness or increased pain.   · Any questions

## 2019-02-11 NOTE — ANESTHESIA POSTPROCEDURE EVALUATION
Procedure(s): MICRO LARYNGOSCOPY WITH CO2 LASER AND BIOPSIES OF RIGHT TRUE VOCAL CORD AND RIGHT FALSE VOCAL CORD. Anesthesia Post Evaluation Patient location during evaluation: PACU Patient participation: complete - patient participated Level of consciousness: awake Pain management: adequate Airway patency: patent Anesthetic complications: no 
Cardiovascular status: hemodynamically stable Respiratory status: acceptable Hydration status: acceptable Comments: I have seen and evaluated the patient. The patient is ready for PACU discharge. [address], DO Visit Vitals /86 Pulse 86 Temp 37.1 °C (98.7 °F) Resp 14 Ht 6' 1.5\" (1.867 m) Wt 68.9 kg (152 lb 0 oz) SpO2 98% BMI 19.78 kg/m²

## 2019-11-06 ENCOUNTER — OFFICE VISIT (OUTPATIENT)
Dept: NEUROLOGY | Age: 84
End: 2019-11-06

## 2019-11-06 VITALS
DIASTOLIC BLOOD PRESSURE: 84 MMHG | SYSTOLIC BLOOD PRESSURE: 142 MMHG | OXYGEN SATURATION: 98 % | BODY MASS INDEX: 19.91 KG/M2 | WEIGHT: 153 LBS | HEART RATE: 54 BPM | RESPIRATION RATE: 18 BRPM

## 2019-11-06 DIAGNOSIS — G30.1 LATE ONSET ALZHEIMER'S DISEASE WITHOUT BEHAVIORAL DISTURBANCE (HCC): Primary | ICD-10-CM

## 2019-11-06 DIAGNOSIS — F02.80 LATE ONSET ALZHEIMER'S DISEASE WITHOUT BEHAVIORAL DISTURBANCE (HCC): Primary | ICD-10-CM

## 2019-11-06 DIAGNOSIS — G47.00 INSOMNIA, UNSPECIFIED TYPE: ICD-10-CM

## 2019-11-06 RX ORDER — MEMANTINE HYDROCHLORIDE 10 MG/1
TABLET ORAL
Qty: 180 TAB | Refills: 1 | Status: SHIPPED | OUTPATIENT
Start: 2019-11-06 | End: 2020-06-17

## 2019-11-06 NOTE — PROGRESS NOTES
Neurology Clinic Follow up Note    Patient ID:  Alejandra Masterson  [id number]  65 y.o.  1935      Mr. Sylvain Engel is here for follow up today of dementia       Last Appointment With Me:  12/20/2018       Interval History:   Pt here with his family today. He appears more easily agitated since last visit. Family discontinued Namenda as they did not note a significant difference on the medication. He did have 2 falls since last visit, no significant injuries afterwards. He has difficulty staying asleep at night. Often stands by the front door in the middle of the night which concerns his family. Ability to function:  Driving: not driving  Finances: handled by his wife now  Cooking: no  Manages own medication: N/A  Residing: at home with his wife and/or daughter, 24h assistance  PMHx/ PSHx/ FHx/ SHx:  Reviewed and unchanged previous visit. Past Medical History:   Diagnosis Date    Arthritis     LEFT SHOULDER    Dementia ([address]          ROS:  Comprehensive review of systems negative except for as noted above. Objective:       Meds:  None    Exam:  Visit Vitals  /84   Pulse (!) 54   Resp 18   Wt 69.4 kg (153 lb)   SpO2 98%   BMI 19.91 kg/m²     NEUROLOGICAL EXAM:  General: Awake, alert, speech fluent. Disoriented to date, knows month not year. Knows POTUS. Difficulty with serial 7's. CN: PERRL, EOMI without nystagmus, VFF to confrontation, facial sensation and strength are normal and symmetric, hearing is intact to finger rub bilaterally, palate and tongue movements are intact and symmetric. Motor: Normal tone, bulk and strength bilaterally. Reflexes: 1/4 and symmetric, plantar stimulation is flexor. Coordination: FNF, GIO, HTS intact. Sensation: LT intact throughout. Gait: Normal-based and steady.         LABS  Results for orders placed or performed during the hospital encounter of 02/06/19   CBC WITH AUTOMATED DIFF   Result Value Ref Range    WBC 5.4 4.1 - 11.1 K/uL    RBC 4.41 4.10 - 5.70 M/uL    HGB 12.9 12.1 - 17.0 g/dL    HCT 41.3 36.6 - 50.3 %    MCV 93.7 80.0 - 99.0 FL    MCH 29.3 26.0 - 34.0 PG    MCHC 31.2 30.0 - 36.5 g/dL    RDW 12.0 11.5 - 14.5 %    PLATELET 605 944 - 130 K/uL    MPV 9.4 8.9 - 12.9 FL    NRBC 0.0 0  WBC    ABSOLUTE NRBC 0.00 0.00 - 0.01 K/uL    NEUTROPHILS 70 32 - 75 %    LYMPHOCYTES 20 12 - 49 %    MONOCYTES 8 5 - 13 %    EOSINOPHILS 2 0 - 7 %    BASOPHILS 0 0 - 1 %    IMMATURE GRANULOCYTES 0 0.0 - 0.5 %    ABS. NEUTROPHILS 3.8 1.8 - 8.0 K/UL    ABS. LYMPHOCYTES 1.1 0.8 - 3.5 K/UL    ABS. MONOCYTES 0.4 0.0 - 1.0 K/UL    ABS. EOSINOPHILS 0.1 0.0 - 0.4 K/UL    ABS. BASOPHILS 0.0 0.0 - 0.1 K/UL    ABS. IMM. GRANS. 0.0 0.00 - 0.04 K/UL    DF AUTOMATED         Assessment:     Encounter Diagnoses     ICD-10-CM ICD-9-CM   1. Late onset Alzheimer's disease without behavioral disturbance (HCC) G30.1 331.0    F02.80 294.10   2. Insomnia, unspecified type G47.00 1.50     81year old male here for f/u of progressive cognitive decline x 7-8 years previously followed at [address] [place] [address]. [address] [place] [address] 16/30 with significantly impaired delayed recall, orientation, language, visuospatial/executive functioning. More mild inattention/naming deficits noted. Examination is otherwise non-focal.  Head CT from 1/2018 reviewed without significant abnormalities. [facility] records reviewed- MRI Brain previously performed and without acute abnormalities. Findings are supportive of a moderate dementia without behavioral disturbance, probable AD. Explained that AD is a progressive disease process. Discussed treatment options in detail including risks/benefits and expectations. While medication is not likely to made a \"night and day\" difference, it may aid modestly in improving concentration and attention. Medication titration and addition of adjunctive agents may be necessary to achieve maximum benefit.       Acetylcholinesterase inhibitors and NMDA receptor antagonist may be used to assist with attention and recall. He has tried Aricept in the past with reported side effects/lightheadedness. Patient's family discontinued Namenda as they did not note a significant benefit on medication. They are willing to resume medication today with subsequent dose titration to assist with recall/attention. Discussed that he would benefit from designation of a POA to assist with executive decision making. Finances and medication administration should be monitored to ensure accuracy and prevent errors. He should continue to abstain from driving. Discussed biking safety and preference for stationary biking due to difficulties with navigation and visuospatial dysfunction on examination. He is currently residing independently with his wife who works part-time. He has 24h supervision with the assistance of his wife and daughter. Plan:   Resume Namenda 10mg qhs x 1 week, then increase to 10mg BID  If still having frequent nocturnal awakenings, may consider addition of Ativan 0.5mg qhs  Heart healthy diet and exercise  Regular scheduled cognitive and social engagement. Follow-up and Dispositions    · Return in about 6 months (around 5/6/2020).            Signed:  Brina Sheikh,   11/6/2019

## 2019-12-05 ENCOUNTER — OFFICE VISIT (OUTPATIENT)
Dept: FAMILY MEDICINE CLINIC | Age: 84
End: 2019-12-05

## 2019-12-05 VITALS
OXYGEN SATURATION: 97 % | DIASTOLIC BLOOD PRESSURE: 87 MMHG | WEIGHT: 158.4 LBS | RESPIRATION RATE: 18 BRPM | SYSTOLIC BLOOD PRESSURE: 158 MMHG | BODY MASS INDEX: 22.18 KG/M2 | HEIGHT: 71 IN | TEMPERATURE: 97.8 F | HEART RATE: 78 BPM

## 2019-12-05 DIAGNOSIS — E78.49 OTHER HYPERLIPIDEMIA: ICD-10-CM

## 2019-12-05 DIAGNOSIS — R53.83 FATIGUE, UNSPECIFIED TYPE: ICD-10-CM

## 2019-12-05 DIAGNOSIS — R63.4 WEIGHT LOSS: ICD-10-CM

## 2019-12-05 DIAGNOSIS — Z00.00 MEDICARE ANNUAL WELLNESS VISIT, SUBSEQUENT: ICD-10-CM

## 2019-12-05 DIAGNOSIS — L98.9 SKIN LESION: ICD-10-CM
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
## 2019-12-05 DIAGNOSIS — Z23 ENCOUNTER FOR IMMUNIZATION: ICD-10-CM

## 2019-12-05 DIAGNOSIS — E55.9 VITAMIN D DEFICIENCY: Primary | ICD-10-CM

## 2019-12-05 PROBLEM — G30.9 ALZHEIMER'S DEMENTIA WITHOUT BEHAVIORAL DISTURBANCE (HCC): Status: ACTIVE | Noted: 2019-12-05

## 2019-12-05 PROBLEM — F02.80 ALZHEIMER'S DEMENTIA WITHOUT BEHAVIORAL DISTURBANCE (HCC): Status: ACTIVE | Noted: 2019-12-05

## 2019-12-05 NOTE — PROGRESS NOTES
Rajeev Leone is a 81 y.o. male  Chief Complaint   Patient presents with    Annual Wellness Visit    Complete Physical     Visit Vitals  /87 (BP 1 Location: Left arm, BP Patient Position: Sitting)   Pulse 78   Temp 97.8 °F (36.6 °C) (Oral)   Resp 18   Ht 5' 11.06\" (1.805 m)   Wt 158 lb 6.4 oz (71.8 kg)   SpO2 97%   BMI 22.05 kg/m²      1. Have you been to the ER, urgent care clinic since your last visit? Hospitalized since your last visit? NO    2. Have you seen or consulted any other health care providers outside of the [address] [place] [address] since your last visit? Include any pap smears or colon screening. 11/6/19 Dr. Carrington-neurology.
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
## 2019-12-05 NOTE — PATIENT INSTRUCTIONS
1) Healthy Weight  Body Mass Index is a noninvasive way to screen for weight and body fat. This is a mathematical calculation based on your height and weight. A healthy BMI is between 20% -24.9%. Your BMI is 22 %. There is a relationship between high BMI and various healthy problems, such as osteoarthritis, muscle pain, increased risk of cancer, diabetes, heart disease, stroke, hypertension, high cholesterol, sleep apnea, breathing problems, depression, which is why a healthy BMI is so important. In terms of weight loss, a 5-10% reduction in body weight over 3-6 months is a reasonable goal.  There would likely be improvements in risk for disease such as diabetes and heart disease, with this amount of weight loss. In order to lose weight, try reducing your daily intake of calories by decreasing portion size of food and increase exercise to help reduce weight. Eat 3-5 small meals per day instead of 3 large meals. A good tip to losing weight : DON'T EAT OR DRINK ANYTHING AFTER DINNER! Choose lean meats for protein source which include chicken, pork, and turkey. The recommended serving size for protein is a 2-3 oz serving (the size of your fist), and 1-1.5 oz of carbohydrate per meal (about 1 cup). Increase servings of fruits and vegetables. Limit processed carbohydrates, (i.e. most breads, crackers, pasta, chips, rice, breaded or battered food, etc). If you choose to eat carbohydrates, whole wheat, (instead of white) is a healthier option for bread, rice, and crackers. Avoid fried foods. Limit sugar. Do your best to avoid all sweetened beverages, such as alcohol, juice, sodas or sweet teas, drink water, unsweet tea, diet soda, or crystal light as options instead. (Don't drink more than 2 of the 12oz artificially sweetened drinks per day as these can increase hunger and make it more difficult to lose weight. The daily goal for water intake should be at least 64 oz/day for most people.      Daily exercise will also help with weight loss and overall health. A minimum of 150 minutes a week of moderate exercise is recommended (30 minutes per day). Make exercise a routine part of your day - for example, park in spaces far away from [facility], take stairs instead of elevator, if sitting for long periods, get up from chair and walk every hour. Recruit a friend or relative to exercise with you and keep you on schedule. It is much easier to exercise with a jignesh who will make sure you work out each day! Home DVDs can be a great way to work out, if you will do them (otherwise, they aren't worth the money!) [employer] is a eight week mixed martial arts (MMA) based workout. It has 5 main workout DVDs, each one is 45 minutes consisting of a 10 minute warm-up, five 5 minute workouts and a 6 minute stretching/cool down. It is easy to follow, with options to modify each move and you only need hand weights and some space to do the work out. Meal planning smart phone applications like [employer] can help plan healthy meals. Get 7-8 hours of sleep each night. For reliable dietary information, go to www. EATRIGHT.org.    You may wish to consider seeing the nutritionist at [facility] ([phone]), [facility] ([phone]) or [place] ([phone]). Alternatively, you can dial [employer] at [phone] (Monday - Friday 9am - 5pm) for a complimentary (free) conversation about your diet. Meal plans, grocery list and tips for healthy eating can be sent to you upon request.     Free smart phone application to help manage weight loss: MyFitnessPal = tracks food intake, exercise and weight. Daily nutritional summary. Meal      2) Referral to Dermatology for further evaluation of skin lesion and for skin examine    3) Xray ordered for left knee, after fall.      4)  Shingles Vaccine - Shingrix  Herpes Zoster (Shingles)     Herpes zoster (shingles) is a painful rash caused by the same virus that causes chickenpox. After an episode of chickenpox, the virus retreats to cells of the nervous system, where it can reside quietly for decades. However, later in life, the varicella-zoster virus can become active again. When it reactivates, it causes shingles. Shingles can affect people of all ages. It is particularly common in adults over age 48 years. It is also more common in individuals of all ages with conditions that weaken the immune system. Pain is usually the first sign of shingles. Other symptoms include: fever, chills, headache, numbness, tingling and/or burning pain and a skin rash. The rash can appear anywhere on your body, but most commonly on the torso. It is usually on only 1 side of your body, in a band or beltlike pattern. During the first several days, small, painful blisters appear in the area. Scarring may occur where the blisters appear and there may be continued sensitivity and pain in that patch of skin for months after the infection. Treatment:   There is no cure for shingles - it is usually self-limiting. However, anti-viral medications can reduce the spread of the rash, speed healing and decrease the risk of complications. Supportive Treatment:   1)  Tylenol or ibuprofen can be used to reduce pain  2) Colloidal oatmeal bath or wet cool compresses may help with itching. Make a solution of cool water and cornstarch, baking soda, or Aveeno Oatmeal.  Apply the solution as a compress to the area. This will soothe the skin. 3) Wash the skin with soap and water to keep rash free of infection. 4) Reduce stress - exercise, yoga, healthy diet      THE BLISTER FLUID IS CONTAGIOUS TO ANYONE WHO HAS NOT ALREADY HAD CHICKEN POX. Stay home from work or school until all blisters have formed a crust (usually 2 weeks after the illness begins) and you are no longer contagious.     Prevention:  The CDC recommends everyone over the age of 61 receive the shingles vaccine. This is recommended for people who have already had a shingles outbreak as it could prevent future occurrences of the disease. According to the CDC, it is also recommended for people born before [birth year] in the [address] [place] [address] who have not had varicella (chicken pox) as they are considered immune. [facility] is a vaccine indicated for prevention of herpes zoster (shingles) in adults aged 48 years and older and is the preferred vaccine for preventing shingles and related complications    The Center for Disease Control and Prevention recommended Shingrix for the following:  - healthy adults aged 48 years and older to prevent shingles and related complications  - adults who previously received the current shingles vaccine (Zostavax®) to prevent shingles and related complications    Two doses (0.5 mL each) are needed for full efficacy. The vaccines are administered intramuscularly, with the second one administered 2-6 months after the initial vaccine. 5) Make a nurse only visit for a Pneumovax 23 vaccine    Schedule of Personalized Health Plan    The best way to stay healthy is to live a healthy lifestyle. A healthy lifestyle includes regular exercise, eating a well-balanced diet, keeping a healthy weight and not smoking. Regular physical exams and screening tests are another important way to take care of yourself. Preventive exams provided by health care providers can find health problems early when treatment works best and can keep you from getting certain diseases or illnesses. Preventive services include exams, lab tests, screening tests, shots, and learning information to help you take care of your own health. The CDC recommends pneumonia vaccines for anyone 72 years and older. These vaccines are usually only needed once in a lifetime unless your healthcare provider decides differently. The 2 pneumonia shots available presently are PCV 13 (Prevnar 13) and PPSV23 (Pneumovax 23).    Adults 72 years or older who have not previously received PCV13, should receive a dose of PCV13 first, followed 1 year later by a dose of PPSV23. All people over 65 should have a yearly flu vaccine. People over 65 are at medium to high risk for Hepatitis B. Hepatitis B is transmitted through body fluids with a common source being sexual activity or IV drug use. Three shots are needed for complete protection. The CDC recommends the herpes zoster (shingles) vaccine for all adults 61 and older, regardless if a prior episode of zoster has been reported. In addition to your physical exam, some screening tests are recommended:    Osteoporosis screening -There are no signs or symptoms of osteoporosis (weakening of bones). You might not know you have the disease until you break a bone. Thats why its so important to get a bone density test to measure your bone strength. Bone mass measurement is taken with a Dexa scan and is recommended every two years after 72years old or if you have certain risk factors, such as personal history of vertebral fracture or chronic steroid medication use. Diabetes Mellitus screening is recommended every year. This is a blood test, called a hemoglobin A1c, which measures the average blood sugar over a 3 month period. Glaucoma is an eye disease caused by high pressure in the eye. An eye exam is recommended every year. Cardiovascular screening tests that check your cholesterol and other blood fat (lipid) levels are recommended every five years or yearly if you are on medications for cardiovascular disease. Colorectal Cancer screening tests help to find pre-cancerous polyps (growths in the colon) so they can be removed before they turn into cancer. Screening tests are recommended starting at age 48 or earlier if you have a certain risk factors, such as a family history of colon cancer.       Here is a list of your current Health Maintenance items including a date when each one is due next:  Health Maintenance   Topic Date Due    Shingrix Vaccine Age 50> (1 of 2) 06/27/1986    GLAUCOMA SCREENING Q2Y  06/27/2001    MEDICARE YEARLY EXAM  01/31/2019    Pneumococcal 65+ years (2 of 2 - PPSV23) 07/10/2019    Influenza Age 5 to Adult  08/01/2019    DTaP/Tdap/Td series (2 - Td) 01/20/2028       You do not have an [address] [place] [address]. [address]. 6) Explaining Alzheimer's is difficult as the symptoms and degree of disease severity varies. Alzheimer's Association has good information on dementia and Alzheimer's, resources to help, caregiver support and other information. Visit their website at [address]. There is a [employer] located in [place] (ph: [phone]). Think of the brain as a map with a variety of cities on it. The ingredient of a memory is stored in different cities in the map. When you retrieve a memory, the roads (neural pathways) leading to each \"city (memory) allows you to access each ingredient of the memory. If one of those roads are blocked, you can't retrieve the entire memory. However, if there are multiple ways to get to the city (such as a country road, a highway, a secondary road, etc), then you can still access the city using a different road. You make new \"roads\" by learning new things. Learning new information or a new skill creates new neural connections in the brain. (Doing crossword puzzles and suduko accesses known facts, so this does not make new neural pathways which is needed to help prevent Alzheimer's - but does keep your mind active.)     Alzheimer's disease and other dementias gradually diminish a person's ability to communicate. Communication with a person with Alzheimer's requires patience, understanding and good listening skills. Changes in the ability to communicate can vary, and are based on the person and where he or she is in the disease process.  Problems you can expect to see throughout the progression of the disease include:  Difficulty finding the right words  Using familiar words repeatedly  Describing familiar objects rather than calling them by name  Easily losing a train of thought  Difficulty organizing words logically  Reverting to speaking a native language     Use the \"Yes, and. Misty Sow Misty Sow \" approach (adapted from the jany Improv technique) - this involves agreeing with the Alzheimer patient's reality and then inserting yourself into the present day. It makes the person feel good. For example, if your 80year old grandmother states she is waiting for her mom to come pick her up from school, don't correct her and say her mother has been dead for over 21 years. Instead, say \"While we wait for your mom, why don't we have a cup of tea (or go for a walk or whatever). If you disagree, you cause the person to feel bad. The Alzheimer's patient may not remember what the disagreement was about (can't recall it,) but the emotional response to the interaction is real and can linger throughout the day. Learning About the [address] [facility] [employer] Diet  What is the Mediterranean diet? The Mediterranean diet is a style of eating rather than a diet plan. It features foods eaten in [place], [place], [place] and [place], and other countries along the [facility]. It emphasizes eating foods like fish, fruits, vegetables, beans, high-fiber breads and whole grains, nuts, and olive oil. This style of eating includes limited red meat, cheese, and sweets. Why choose the Mediterranean diet? A Mediterranean-style diet may improve heart health. It contains more fat than other heart-healthy diets. But the fats are mainly from nuts, unsaturated oils (such as fish oils and olive oil), and certain nut or seed oils (such as canola, soybean, or flaxseed oil). These fats may help protect the heart and blood vessels. How can you get started on the Mediterranean diet?   Here are some things you can do to switch to a more Mediterranean way of eating. What to eat  · Eat a variety of fruits and vegetables each day, such as grapes, blueberries, tomatoes, broccoli, peppers, figs, olives, spinach, eggplant, beans, lentils, and chickpeas. · Eat a variety of whole-grain foods each day, such as oats, brown rice, and whole wheat bread, pasta, and couscous. · Eat fish at least 2 times a week. Try tuna, salmon, mackerel, lake trout, herring, or sardines. · Eat moderate amounts of low-fat dairy products, such as milk, cheese, or yogurt. · Eat moderate amounts of poultry and eggs. · Choose healthy (unsaturated) fats, such as nuts, olive oil, and certain nut or seed oils like canola, soybean, and flaxseed. · Limit unhealthy (saturated) fats, such as butter, palm oil, and coconut oil. And limit fats found in animal products, such as meat and dairy products made with whole milk. Try to eat red meat only a few times a month in very small amounts. · Limit sweets and desserts to only a few times a week. This includes sugar-sweetened drinks like soda. The Mediterranean diet may also include red wine with your meal--1 glass each day for women and up to 2 glasses a day for men. Tips for eating at home  · Use herbs, spices, garlic, lemon zest, and citrus juice instead of salt to add flavor to foods. · Add avocado slices to your sandwich instead of faria. · Have fish for lunch or dinner instead of red meat. Brush the fish with olive oil, and broil or grill it. · Sprinkle your salad with seeds or nuts instead of cheese. · Cook with olive or canola oil instead of butter or oils that are high in saturated fat. · Switch from 2% milk or whole milk to 1% or fat-free milk. · Dip raw vegetables in a vinaigrette dressing or hummus instead of dips made from mayonnaise or sour cream.  · Have a piece of fruit for dessert instead of a piece of cake. Try baked apples, or have some dried fruit.   Tips for eating out  · Try broiled, grilled, baked, or poached fish instead of having it fried or breaded. · Ask your  to have your meals prepared with olive oil instead of butter. · Order dishes made with marinara sauce or sauces made from olive oil. Avoid sauces made from cream or mayonnaise. · Choose whole-grain breads, whole wheat pasta and pizza crust, brown rice, beans, and lentils. · Cut back on butter or margarine on bread. Instead, you can dip your bread in a small amount of olive oil. · Ask for a side salad or grilled vegetables instead of french fries or chips. Where can you learn more? Go to http://yueWater Science Technologiesconcha.info/. Enter [phone] in the search box to learn more about \"Learning About the Mediterranean Diet. \"  Current as of: November 7, 2018  Content Version: 12.2  © 4655-9020 [employer]. Care instructions adapted under license by [employer] (which disclaims liability or warranty for this information). If you have questions about a medical condition or this instruction, always ask your healthcare professional. [place] [address] any warranty or liability for your use of this information. Vaccine Information Statement    Influenza (Flu) Vaccine (Inactivated or Recombinant): What You Need to Know    Many Vaccine Information Statements are available in Marshallese and other languages. See www.immunize.org/vis  Hojas de información sobre vacunas están disponibles en español y en muchos otros idiomas. Visite www.immunize.org/vis    1. Why get vaccinated? Influenza vaccine can prevent influenza (flu). Flu is a contagious disease that spreads around the [place] every year, usually between October and May. Anyone can get the flu, but it is more dangerous for some people. Infants and young children, people 72years of age and older, pregnant women, and people with certain health conditions or a weakened immune system are at greatest risk of flu complications.     Pneumonia, bronchitis, sinus infections and ear infections are examples of flu-related complications. If you have a medical condition, such as heart disease, cancer or diabetes, flu can make it worse. Flu can cause fever and chills, sore throat, muscle aches, fatigue, cough, headache, and runny or stuffy nose. Some people may have vomiting and diarrhea, though this is more common in children than adults. Each year thousands of people in the [facility] die from flu, and many more are hospitalized. Flu vaccine prevents millions of illnesses and flu-related visits to the doctor each year. 2. Influenza vaccines     CDC recommends everyone 10months of age and older get vaccinated every flu season. Children 6 months through 6years of age may need 2 doses during a single flu season. Everyone else needs only 1 dose each flu season. It takes about 2 weeks for protection to develop after vaccination. There are many flu viruses, and they are always changing. Each year a new flu vaccine is made to protect against three or four viruses that are likely to cause disease in the upcoming flu season. Even when the vaccine doesnt exactly match these viruses, it may still provide some protection. Influenza vaccine does not cause flu. Influenza vaccine may be given at the same time as other vaccines. 3. Talk with your health care provider    Tell your vaccine provider if the person getting the vaccine:   Has had an allergic reaction after a previous dose of influenza vaccine, or has any severe, life-threatening allergies.  Has ever had Guillain-Barré Syndrome (also called GBS). In some cases, your health care provider may decide to postpone influenza vaccination to a future visit. People with minor illnesses, such as a cold, may be vaccinated. People who are moderately or severely ill should usually wait until they recover before getting influenza vaccine. Your health care provider can give you more information.     4. Risks of a reaction     Soreness, redness, and swelling where shot is given, fever, muscle aches, and headache can happen after influenza vaccine.  There may be a very small increased risk of Guillain-Barré Syndrome (GBS) after inactivated influenza vaccine (the flu shot). Josephine Taylor children who get the flu shot along with pneumococcal vaccine (PCV13), and/or DTaP vaccine at the same time might be slightly more likely to have a seizure caused by fever. Tell your health care provider if a child who is getting flu vaccine has ever had a seizure. People sometimes faint after medical procedures, including vaccination. Tell your provider if you feel dizzy or have vision changes or ringing in the ears. As with any medicine, there is a very remote chance of a vaccine causing a severe allergic reaction, other serious injury, or death. 5. What if there is a serious problem? An allergic reaction could occur after the vaccinated person leaves the clinic. If you see signs of a severe allergic reaction (hives, swelling of the face and throat, difficulty breathing, a fast heartbeat, dizziness, or weakness), call 9-1-1 and get the person to the nearest hospital.    For other signs that concern you, call your health care provider. Adverse reactions should be reported to the Vaccine Adverse Event Reporting System (VAERS). Your health care provider will usually file this report, or you can do it yourself. Visit the VAERS website at www.vaers. hhs.gov or call 0-737.181.1339. VAERS is only for reporting reactions, and VAERS staff do not give medical advice. 6. The National Vaccine Injury Compensation Program    The [facility] Vaccine Injury Compensation Program (VICP) is a federal program that was created to compensate people who may have been injured by certain vaccines. Visit the VICP website at www.hrsa.gov/vaccinecompensation or call 4-193.964.6840 to learn about the program and about filing a claim.  There is a time limit to file a claim for compensation. 7. How can I learn more?  Ask your health care provider.  Call your local or state health department.  Contact the Centers for Disease Control and Prevention (CDC):  - Call 8-350.401.2665 (1-800-CDC-INFO) or  - Visit CDCs influenza website at www.cdc.gov/flu    Vaccine Information Statement (Interim)  Inactivated Influenza Vaccine   8/15/2019  42 [facility] [id number]   Department of Health and Human Services  Centers for Disease Control and Prevention    Office Use Only

## 2019-12-06 DIAGNOSIS — E55.9 VITAMIN D DEFICIENCY: Primary | ICD-10-CM

## 2019-12-06 LAB
25(OH)D3+25(OH)D2 SERPL-MCNC: 18.7 NG/ML (ref 30–100)
ALBUMIN SERPL-MCNC: 4.2 G/DL (ref 3.5–4.7)
ALBUMIN/GLOB SERPL: 1.7 {RATIO} (ref 1.2–2.2)
ALP SERPL-CCNC: 135 IU/L (ref 39–117)
ALT SERPL-CCNC: 10 IU/L (ref 0–44)
APPEARANCE UR: CLEAR
AST SERPL-CCNC: 17 IU/L (ref 0–40)
BACTERIA #/AREA URNS HPF: NORMAL /[HPF]
BASOPHILS # BLD AUTO: 0 X10E3/UL (ref 0–0.2)
BASOPHILS NFR BLD AUTO: 1 %
BILIRUB SERPL-MCNC: 1 MG/DL (ref 0–1.2)
BILIRUB UR QL STRIP: NEGATIVE
BUN SERPL-MCNC: 8 MG/DL (ref 8–27)
BUN/CREAT SERPL: 8 (ref 10–24)
CALCIUM SERPL-MCNC: 9.1 MG/DL (ref 8.6–10.2)
CASTS URNS QL MICRO: NORMAL /LPF
CHLORIDE SERPL-SCNC: 103 MMOL/L (ref 96–106)
CHOLEST SERPL-MCNC: 156 MG/DL (ref 100–199)
CO2 SERPL-SCNC: 24 MMOL/L (ref 20–29)
COLOR UR: YELLOW
CREAT SERPL-MCNC: 1 MG/DL (ref 0.76–1.27)
EOSINOPHIL # BLD AUTO: 0.1 X10E3/UL (ref 0–0.4)
EOSINOPHIL NFR BLD AUTO: 2 %
EPI CELLS #/AREA URNS HPF: NORMAL /HPF (ref 0–10)
ERYTHROCYTE [DISTWIDTH] IN BLOOD BY AUTOMATED COUNT: 11.6 % (ref 12.3–15.4)
GLOBULIN SER CALC-MCNC: 2.5 G/DL (ref 1.5–4.5)
GLUCOSE SERPL-MCNC: 94 MG/DL (ref 65–99)
GLUCOSE UR QL: NEGATIVE
HCT VFR BLD AUTO: 38.4 % (ref 37.5–51)
HDLC SERPL-MCNC: 50 MG/DL
HGB BLD-MCNC: 13 G/DL (ref 13–17.7)
HGB UR QL STRIP: NEGATIVE
IMM GRANULOCYTES # BLD AUTO: 0 X10E3/UL (ref 0–0.1)
IMM GRANULOCYTES NFR BLD AUTO: 0 %
INTERPRETATION, 910389: NORMAL
KETONES UR QL STRIP: NEGATIVE
LDLC SERPL CALC-MCNC: 96 MG/DL (ref 0–99)
LEUKOCYTE ESTERASE UR QL STRIP: NEGATIVE
LYMPHOCYTES # BLD AUTO: 1 X10E3/UL (ref 0.7–3.1)
LYMPHOCYTES NFR BLD AUTO: 16 %
MCH RBC QN AUTO: 29.6 PG (ref 26.6–33)
MCHC RBC AUTO-ENTMCNC: 33.9 G/DL (ref 31.5–35.7)
MCV RBC AUTO: 88 FL (ref 79–97)
MICRO URNS: NORMAL
MICRO URNS: NORMAL
MONOCYTES # BLD AUTO: 0.4 X10E3/UL (ref 0.1–0.9)
MONOCYTES NFR BLD AUTO: 7 %
MUCOUS THREADS URNS QL MICRO: PRESENT
NEUTROPHILS # BLD AUTO: 4.3 X10E3/UL (ref 1.4–7)
NEUTROPHILS NFR BLD AUTO: 74 %
NITRITE UR QL STRIP: NEGATIVE
PH UR STRIP: 7 [PH] (ref 5–7.5)
PLATELET # BLD AUTO: 264 X10E3/UL (ref 150–450)
POTASSIUM SERPL-SCNC: 3.6 MMOL/L (ref 3.5–5.2)
PROT SERPL-MCNC: 6.7 G/DL (ref 6–8.5)
PROT UR QL STRIP: NORMAL
RBC # BLD AUTO: 4.39 X10E6/UL (ref 4.14–5.8)
RBC #/AREA URNS HPF: NORMAL /HPF (ref 0–2)
SODIUM SERPL-SCNC: 142 MMOL/L (ref 134–144)
SP GR UR: 1.02 (ref 1–1.03)
TRIGL SERPL-MCNC: 50 MG/DL (ref 0–149)
TSH SERPL DL<=0.005 MIU/L-ACNC: 1.63 UIU/ML (ref 0.45–4.5)
URINALYSIS REFLEX, 377202: NORMAL
UROBILINOGEN UR STRIP-MCNC: 1 MG/DL (ref 0.2–1)
VLDLC SERPL CALC-MCNC: 10 MG/DL (ref 5–40)
WBC # BLD AUTO: 5.8 X10E3/UL (ref 3.4–10.8)
WBC #/AREA URNS HPF: NORMAL /HPF (ref 0–5)

## 2019-12-06 RX ORDER — ERGOCALCIFEROL 1.25 MG/1
50000 CAPSULE ORAL
Qty: 12 CAP | Refills: 0 | Status: SHIPPED | OUTPATIENT
Start: 2019-12-06 | End: 2021-08-18

## 2019-12-12 ENCOUNTER — TELEPHONE (OUTPATIENT)
Dept: FAMILY MEDICINE CLINIC | Age: 84
End: 2019-12-12

## 2019-12-12 NOTE — TELEPHONE ENCOUNTER
----- Message from Raymon Garcia sent at 12/11/2019 12:25 PM EST -----  Regarding: LELA Raymundo/Telephone  Reason for call: Checking to see if the practice has more pneumonia shots. Pt came in last week and the practice had run out. Callback required yes/no and why: yes  Best contact number(s): [phone], Leonor Jimenez, spouse  Details to clarify the request: Unsure if this is the right shot, pls check records.

## 2020-02-17 ENCOUNTER — OFFICE VISIT (OUTPATIENT)
Dept: FAMILY MEDICINE CLINIC | Age: 85
End: 2020-02-17

## 2020-02-17 VITALS
SYSTOLIC BLOOD PRESSURE: 120 MMHG | HEART RATE: 80 BPM | HEIGHT: 70 IN | WEIGHT: 153 LBS | TEMPERATURE: 98.7 F | OXYGEN SATURATION: 99 % | DIASTOLIC BLOOD PRESSURE: 70 MMHG | RESPIRATION RATE: 18 BRPM | BODY MASS INDEX: 21.9 KG/M2

## 2020-02-17 DIAGNOSIS — J40 BRONCHITIS: Primary | ICD-10-CM

## 2020-02-17 RX ORDER — AZITHROMYCIN 250 MG/1
TABLET, FILM COATED ORAL
Qty: 6 TAB | Refills: 0 | Status: SHIPPED | OUTPATIENT
Start: 2020-02-17 | End: 2020-06-17

## 2020-02-17 RX ORDER — TRIAMCINOLONE ACETONIDE 1 MG/G
OINTMENT TOPICAL
COMMUNITY
Start: 2019-12-16 | End: 2021-08-18

## 2020-02-17 RX ORDER — BENZONATATE 200 MG/1
200 CAPSULE ORAL
Qty: 20 CAP | Refills: 0 | Status: SHIPPED | OUTPATIENT
Start: 2020-02-17 | End: 2020-06-17

## 2020-02-17 NOTE — PROGRESS NOTES
Shlomo Hauser is a 81 y.o. male  HIPAA verified by two patient identifiers. Health Maintenance Due   Topic    Shingrix Vaccine Age 50> (1 of 2)    GLAUCOMA SCREENING Q2Y     Pneumococcal 65+ years (2 of 2 - PPSV23)     Chief Complaint   Patient presents with    Cold Symptoms     Visit Vitals  /70 (BP 1 Location: Left arm, BP Patient Position: Sitting)   Pulse 80   Temp 98.7 °F (37.1 °C) (Oral)   Resp 18   Ht 5' 10.25\" (1.784 m)   Wt 153 lb (69.4 kg)   SpO2 99%   BMI 21.80 kg/m²       Pain Scale: 0 - No pain/10  Pain Location:     1. Have you been to the ER, urgent care clinic since your last visit? Hospitalized since your last visit? No    2. Have you seen or consulted any other health care providers outside of the [address] [place] [address] since your last visit? Include any pap smears or colon screening.  No

## 2020-02-17 NOTE — PROGRESS NOTES
Subjective:   Monique Chery is a 81 y.o. male who complains of congestion, post nasal drip, productive cough of clear to white mucus and hoarseness for 7 days, gradually worsening since that time. His great grandson had URI last week. He denies a history of fevers, myalgias, shortness of breath and wheezing. Tried OTC cold remedies with temporary relief (generic cold medicine). No evaluation to date. No history of asthma or allergies. Had pneumonia as a child. He got his flu vaccine this season; no known flu contacts. Accompanied by wife who helps with history. Past Medical History:   Diagnosis Date    Arthritis     LEFT SHOULDER    Dementia ([address]      Social History     Tobacco Use    Smoking status: Former Smoker     Last attempt to quit: 2008     Years since quittin.5    Smokeless tobacco: Never Used    Tobacco comment: CIGARS AND PIPES   Substance Use Topics    Alcohol use: Yes     Comment: WINE ONCE IN A WHILE    Drug use: No     Outpatient Medications Marked as Taking for the 20 encounter (Office Visit) with Sailaja Meng PA-C   Medication Sig Dispense Refill    triamcinolone acetonide (KENALOG) 0.1 % ointment       ergocalciferol (ERGOCALCIFEROL) 50,000 unit capsule Take 1 Cap by mouth every seven (7) days. Indications: Vitamin D Deficiency (High Dose Therapy) 12 Cap 0    memantine (NAMENDA) 10 mg tablet Take 10mg qhs x1 week then increase to 10mg  Tab 1     No Known Allergies     Review of Systems  A comprehensive review of systems was negative except for that written in the HPI. Objective:     Visit Vitals  /70 (BP 1 Location: Left arm, BP Patient Position: Sitting)   Pulse 80   Temp 98.7 °F (37.1 °C) (Oral)   Resp 18   Ht 5' 10.25\" (1.784 m)   Wt 153 lb (69.4 kg)   SpO2 99%   BMI 21.80 kg/m²     General:   alert, cooperative   Eyes: conjunctivae/scleras clear.  PERRL, EOM's intact   Ears: External auditory canals clear, tympanic membranes clear   Sinuses/Nose: No maxillary or frontal tenderness. Mouth:  No oral lesions, no pharyngeal erythema, no exudates   Neck: Supple, trachea midline   Heart: S1 and S2 normal,no murmurs noted    Lungs: Clear to auscultation bilaterally, no increased work of breathing   Extremities: No edema or cyanosis          No results found for this visit on 02/17/20. Assessment/Plan:     1. Bronchitis      Symptoms x 1 wk so no flu test performed,Benign exam, clear mucus, appears well, treat symptomatically  Tessalon, plain mucinex expectorant prn, fluids, rest  If not improving over next 5-7 days or mucus turns purulent then may start Zpak    Orders Placed This Encounter    benzonatate (TESSALON) 200 mg capsule     Sig: Take 1 Cap by mouth three (3) times daily as needed for Cough. Dispense:  20 Cap     Refill:  0    azithromycin (ZITHROMAX) 250 mg tablet     Sig: Take 2 tablets today, then take 1 tablet daily. Dispense:  6 Tab     Refill:  0         Verbal and written instructions (see AVS) provided. Patient expresses understanding of diagnosis and treatment plan.

## 2020-02-17 NOTE — PATIENT INSTRUCTIONS
Bronchitis: Care Instructions  Your Care Instructions    Bronchitis is inflammation of the bronchial tubes, which carry air to the lungs. The tubes swell and produce mucus, or phlegm. The mucus and inflamed bronchial tubes make you cough. You may have trouble breathing. Most cases of bronchitis are caused by viruses like those that cause colds. Antibiotics usually do not help and they may be harmful. Bronchitis usually develops rapidly and lasts about 2 to 3 weeks in otherwise healthy people. Follow-up care is a key part of your treatment and safety. Be sure to make and go to all appointments, and call your doctor if you are having problems. It's also a good idea to know your test results and keep a list of the medicines you take. How can you care for yourself at home? · Take all medicines exactly as prescribed. Call your doctor if you think you are having a problem with your medicine. · Get some extra rest.  · Take an over-the-counter pain medicine, such as acetaminophen (Tylenol), ibuprofen (Advil, Motrin), or naproxen (Aleve) to reduce fever and relieve body aches. Read and follow all instructions on the label. · Do not take two or more pain medicines at the same time unless the doctor told you to. Many pain medicines have acetaminophen, which is Tylenol. Too much acetaminophen (Tylenol) can be harmful. · Take an over-the-counter cough medicine that contains dextromethorphan to help quiet a dry, hacking cough so that you can sleep. Avoid cough medicines that have more than one active ingredient. Read and follow all instructions on the label. · Breathe moist air from a humidifier, hot shower, or sink filled with hot water. The heat and moisture will thin mucus so you can cough it out. · Do not smoke. Smoking can make bronchitis worse. If you need help quitting, talk to your doctor about stop-smoking programs and medicines. These can increase your chances of quitting for good.   When should you call for help? Call 911 anytime you think you may need emergency care. For example, call if:    · You have severe trouble breathing.    Call your doctor now or seek immediate medical care if:    · You have new or worse trouble breathing.     · You cough up dark brown or bloody mucus (sputum).     · You have a new or higher fever.     · You have a new rash.    Watch closely for changes in your health, and be sure to contact your doctor if:    · You cough more deeply or more often, especially if you notice more mucus or a change in the color of your mucus.     · You are not getting better as expected. Where can you learn more? Go to http://yue-concha.info/. Enter H333 in the search box to learn more about \"Bronchitis: Care Instructions. \"  Current as of: June 9, 2019  Content Version: 12.2  © 1640-0366 [employer]. Care instructions adapted under license by [employer] (which disclaims liability or warranty for this information). If you have questions about a medical condition or this instruction, always ask your healthcare professional. [address] any warranty or liability for your use of this information.

## 2020-03-06 DIAGNOSIS — E55.9 VITAMIN D DEFICIENCY: ICD-10-CM

## 2020-03-12 ENCOUNTER — TELEPHONE (OUTPATIENT)
Dept: FAMILY MEDICINE CLINIC | Age: 85
End: 2020-03-12

## 2020-03-12 NOTE — TELEPHONE ENCOUNTER
Two pt identifiers confirmed. LPN spoke to [facility] (HIPPA/Wife) in regards to scheduled appt 03/12/2020 for both her and her  at 57 and [phone] for cold symptoms. Wife states, neither one has traveled internationally in the past month or been in contact with confirmed dx of coronavirus. Signs and symptoms they are having are stuffy nose and coughing. Wife is sneezing. No fever and no SOB.  was dx with bronchitis on 02/17/2020. Informed scheduled provider for 03/12/2020 (Dr. Fawn Bliss) and received verbal order to inform pt to stay at home that way there can be a prevention of pt's contacting coronaviruse or flu. If there are any signs and symptoms of fever, sob (respiratory distress), bluish lips, body aches, headaches to please urgently report to ER. Informed pt in the meantime to take Coricidin cough and cold for HTN (due to pt's dx) to help with cough. Also take tylenol, ibuprofen for any pain. [facility] verbalized understanding of information discussed w/ no further questions at this time.

## 2020-06-17 ENCOUNTER — OFFICE VISIT (OUTPATIENT)
Dept: NEUROLOGY | Age: 85
End: 2020-06-17

## 2020-06-17 VITALS
DIASTOLIC BLOOD PRESSURE: 72 MMHG | HEART RATE: 62 BPM | OXYGEN SATURATION: 98 % | BODY MASS INDEX: 23.08 KG/M2 | RESPIRATION RATE: 18 BRPM | WEIGHT: 162 LBS | SYSTOLIC BLOOD PRESSURE: 128 MMHG

## 2020-06-17 DIAGNOSIS — G30.1 LATE ONSET ALZHEIMER'S DISEASE WITHOUT BEHAVIORAL DISTURBANCE (HCC): Primary | ICD-10-CM

## 2020-06-17 DIAGNOSIS — F02.80 LATE ONSET ALZHEIMER'S DISEASE WITHOUT BEHAVIORAL DISTURBANCE (HCC): Primary | ICD-10-CM

## 2020-06-17 DIAGNOSIS — R26.81 GAIT INSTABILITY: ICD-10-CM

## 2020-06-17 NOTE — PROGRESS NOTES
Neurology Clinic Follow up Note    Patient ID:  Deisy Calzada  [id number]  66 y.o.  1935      Mr. Vinny Moseley is here for follow up today of dementia       Last Appointment With Me:  11/6/2019    Interval History:   Pt here with his family today. He reports long term memory is intact. Still having some difficulty with short term recall. He appears more easily agitated since last visit. This is redirectable presently. His wife again discontinued Namenda as they did not note a significant difference on the medication. He denies further falls since last visit. Family has noted some imbalance at times. They may be interested in PT at next visit (holding off for now due to pandemic)  He reports insomnia is improved. Ability to function:  Driving: not driving  Finances: handled by his wife now  Cooking: no  Manages own medication: handled by family  Residing: at home with his wife and/or daughter, 24h assistance    PMHx/ PSHx/ FHx/ SHx:  Reviewed and unchanged previous visit. Past Medical History:   Diagnosis Date    Arthritis     LEFT SHOULDER    Dementia ([facility] [address]          ROS:  Comprehensive review of systems negative except for as noted above. Objective:       Meds:  Current Outpatient Medications   Medication Sig Dispense Refill    triamcinolone acetonide (KENALOG) 0.1 % ointment       ergocalciferol (ERGOCALCIFEROL) 50,000 unit capsule Take 1 Cap by mouth every seven (7) days. Indications: Vitamin D Deficiency (High Dose Therapy) 12 Cap 0         Exam:  Visit Vitals  /72   Pulse 62   Resp 18   Wt 73.5 kg (162 lb)   SpO2 98%   BMI 23.08 kg/m²     NEUROLOGICAL EXAM:  General: Awake, alert, speech fluent. Disoriented to date/month, knows year. Knows POTUS. Difficulty with serial 7's.     CN: PERRL, EOM-slight dysconjugate gaze, VFF to confrontation, facial sensation and strength are normal and symmetric, hearing is intact to finger rub bilaterally, palate and tongue movements are intact and symmetric. Motor: Normal tone, bulk and strength bilaterally. Reflexes: 1/4 and symmetric, plantar stimulation is flexor. Coordination: FNF, GIO, HTS intact. Sensation: LT intact throughout. Gait: Normal-based, slightly unsteady       LABS  Results for orders placed or performed in visit on 12/05/19   CBC WITH AUTOMATED DIFF   Result Value Ref Range    WBC 5.8 3.4 - 10.8 x10E3/uL    RBC 4.39 4.14 - 5.80 x10E6/uL    HGB 13.0 13.0 - 17.7 g/dL    HCT 38.4 37.5 - 51.0 %    MCV 88 79 - 97 fL    MCH 29.6 26.6 - 33.0 pg    MCHC 33.9 31.5 - 35.7 g/dL    RDW 11.6 (L) 12.3 - 15.4 %    PLATELET 608 165 - 442 x10E3/uL    NEUTROPHILS 74 Not Estab. %    Lymphocytes 16 Not Estab. %    MONOCYTES 7 Not Estab. %    EOSINOPHILS 2 Not Estab. %    BASOPHILS 1 Not Estab. %    ABS. NEUTROPHILS 4.3 1.4 - 7.0 x10E3/uL    Abs Lymphocytes 1.0 0.7 - 3.1 x10E3/uL    ABS. MONOCYTES 0.4 0.1 - 0.9 x10E3/uL    ABS. EOSINOPHILS 0.1 0.0 - 0.4 x10E3/uL    ABS. BASOPHILS 0.0 0.0 - 0.2 x10E3/uL    IMMATURE GRANULOCYTES 0 Not Estab. %    ABS. IMM. GRANS. 0.0 0.0 - 0.1 [id number]   METABOLIC PANEL, COMPREHENSIVE   Result Value Ref Range    Glucose 94 65 - 99 mg/dL    BUN 8 8 - 27 mg/dL    Creatinine 1.00 0.76 - 1.27 mg/dL    GFR est non-AA 69 >59 mL/min/1.73    GFR est AA 80 >59 mL/min/1.73    BUN/Creatinine ratio 8 (L) 10 - 24    Sodium 142 134 - 144 mmol/L    Potassium 3.6 3.5 - 5.2 mmol/L    Chloride 103 96 - 106 mmol/L    CO2 24 20 - 29 mmol/L    Calcium 9.1 8.6 - 10.2 mg/dL    Protein, total 6.7 6.0 - 8.5 g/dL    Albumin 4.2 3.5 - 4.7 g/dL    GLOBULIN, TOTAL 2.5 1.5 - 4.5 g/dL    A-G Ratio 1.7 1.2 - 2.2    Bilirubin, total 1.0 0.0 - 1.2 mg/dL    Alk.  phosphatase 135 (H) 39 - 117 IU/L    AST (SGOT) 17 0 - 40 IU/L    ALT (SGPT) 10 0 - 44 IU/L   LIPID PANEL   Result Value Ref Range    Cholesterol, total 156 100 - 199 mg/dL    Triglyceride 50 0 - 149 mg/dL    HDL Cholesterol 50 >39 mg/dL    VLDL, calculated 10 5 - 40 mg/dL    LDL, calculated 96 0 - 99 mg/dL   UA WITH REFLEX MICRO AND CULTURE   Result Value Ref Range    Specific Gravity 1.020 1.005 - 1.030    pH (UA) 7.0 5.0 - 7.5    Color Yellow Yellow    Appearance Clear Clear    Leukocyte Esterase Negative Negative    Protein Trace Negative/Trace    Glucose Negative Negative    Ketone Negative Negative    Blood Negative Negative    Bilirubin Negative Negative    Urobilinogen 1.0 0.2 - 1.0 mg/dL    Nitrites Negative Negative    Microscopic Examination Comment     Microscopic exam See additional order     URINALYSIS REFLEX Comment    VITAMIN D, 25 HYDROXY   Result Value Ref Range    VITAMIN D, 25-HYDROXY 18.7 (L) 30.0 - 100.0 ng/mL   TSH RFX ON ABNORMAL TO FREE T4   Result Value Ref Range    TSH 1.630 0.450 - 4.500 uIU/mL   MICROSCOPIC EXAMINATION   Result Value Ref Range    WBC 0-5 0 - 5 /hpf    RBC 0-2 0 - 2 /hpf    Epithelial cells None seen 0 - 10 /hpf    Casts None seen None seen /lpf    Mucus Present Not Estab. Bacteria None seen None seen/Few   CVD REPORT   Result Value Ref Range    INTERPRETATION Note        Assessment:     Encounter Diagnoses     ICD-10-CM ICD-9-CM   1. Late onset Alzheimer's disease without behavioral disturbance (HCC) G30.1 331.0    F02.80 294.10   2. Gait instability R26.81 781.2     81 y.o. male former  here for f/u of progressive cognitive decline x 7-8 years previously followed at [address] [place] [address]. [address] [place] [address] 16/30 with significantly impaired delayed recall, orientation, language, visuospatial/executive functioning. More mild inattention/naming deficits noted. Examination is otherwise non-focal.  Head CT from 1/2018 reviewed without significant abnormalities. [facility] records reviewed- MRI Brain previously performed and without acute abnormalities. Findings are supportive of a moderate dementia without behavioral disturbance, probable AD. Explained that AD is a progressive disease process. Discussed treatment options in detail including risks/benefits and expectations.   While medication is not likely to made a \"night and day\" difference, it may aid modestly in improving concentration and attention. Medication titration and addition of adjunctive agents may be necessary to achieve maximum benefit. Acetylcholinesterase inhibitors and NMDA receptor antagonist may be used to assist with attention and recall. He has tried Aricept in the past with reported side effects/lightheadedness. Patient's family discontinued Namenda as they did not note a significant benefit on medication. Discussed that he would benefit from designation of a POA to assist with executive decision making. Finances and medication administration should be monitored to ensure accuracy and prevent errors. He should continue to abstain from driving. Discussed biking safety and preference for stationary biking due to difficulties with navigation and visuospatial dysfunction on examination. He is currently residing independently with his wife who works part-time. He has 24h supervision with the assistance of his wife and daughter. Discussed fall precautions due to gait instability and possible physical therapy to address this. Plan:   Fall precautions-family to call if they wish to pursue formal physical therapy for his gait instability  Heart healthy diet and exercise  Regular scheduled cognitive and social engagement. Follow-up and Dispositions    · Return in about 6 months (around 12/17/2020).          Signed:  Suzan Zelaya, DO  6/17/2020

## 2020-06-29 ENCOUNTER — TELEPHONE (OUTPATIENT)
Dept: NEUROLOGY | Age: 85
End: 2020-06-29

## 2020-06-29 RX ORDER — MEMANTINE HYDROCHLORIDE 10 MG/1
TABLET ORAL
Qty: 180 TAB | Refills: 1 | Status: SHIPPED | OUTPATIENT
Start: 2020-06-29 | End: 2021-01-13 | Stop reason: SDUPTHER

## 2020-06-29 NOTE — TELEPHONE ENCOUNTER
Pt's wife calling to get prescription for medication she had taken her  off of but Dr. Matthew Aparicio wants him to start taking it again.

## 2020-06-29 NOTE — TELEPHONE ENCOUNTER
Pt's wife returning call, states she is uncertain of medication name but its the only one Dr Saha Outlaw prescribed. Please call.

## 2020-07-28 ENCOUNTER — VIRTUAL VISIT (OUTPATIENT)
Dept: FAMILY MEDICINE CLINIC | Age: 85
End: 2020-07-28

## 2020-07-28 DIAGNOSIS — H57.89 REDNESS OF LEFT EYE: Primary | ICD-10-CM

## 2020-07-28 NOTE — PROGRESS NOTES
Ca Carr is a 81 y.o. male    HIPAA verified by two patient identifiers. Chief Complaint   Patient presents with    Red Eye     Left eye, not red anymore      1. Have you been to the ER, urgent care clinic since your last visit? Hospitalized since your last visit? No    2. Have you seen or consulted any other health care providers outside of the [address] [place] [address] since your last visit? Include any pap smears or colon screening.  No    DOXY: [phone]

## 2020-07-28 NOTE — PATIENT INSTRUCTIONS
1) Left eye doesn't have redness at this time. Can use artifical tears to help wash eye. Please monitor for signs or symptoms of infection including redness, warmth, swelling, discharge and/or fever/chills. 2) Make a nurse only visit for flu and pneumovax 23 vaccines in September. Due for Medicare Wellness visit in December. 3) Don't stay outside in heat for long - at risk for dehydration, dizziness/lightheadedness. Use good fall precautions - stand up for a moment before walking, etc. Make sure you are well hydrated. Learning About the [address] [facility] [place] book Diet What is the Mediterranean diet? The Mediterranean diet is a style of eating rather than a diet plan. It features foods eaten in [place], [place], [place] and [place], and other countries along the [facility]. It emphasizes eating foods like fish, fruits, vegetables, beans, high-fiber breads and whole grains, nuts, and olive oil. This style of eating includes limited red meat, cheese, and sweets. Why choose the Mediterranean diet? A Mediterranean-style diet may improve heart health. It contains more fat than other heart-healthy diets. But the fats are mainly from nuts, unsaturated oils (such as fish oils and olive oil), and certain nut or seed oils (such as canola, soybean, or flaxseed oil). These fats may help protect the heart and blood vessels. How can you get started on the Mediterranean diet? Here are some things you can do to switch to a more Mediterranean way of eating. What to eat · Eat a variety of fruits and vegetables each day, such as grapes, blueberries, tomatoes, broccoli, peppers, figs, olives, spinach, eggplant, beans, lentils, and chickpeas. · Eat a variety of whole-grain foods each day, such as oats, brown rice, and whole wheat bread, pasta, and couscous. · Eat fish at least 2 times a week. Try tuna, salmon, mackerel, lake trout, herring, or sardines. · Eat moderate amounts of low-fat dairy products, such as milk, cheese, or yogurt. · Eat moderate amounts of poultry and eggs. · Choose healthy (unsaturated) fats, such as nuts, olive oil, and certain nut or seed oils like canola, soybean, and flaxseed. · Limit unhealthy (saturated) fats, such as butter, palm oil, and coconut oil. And limit fats found in animal products, such as meat and dairy products made with whole milk. Try to eat red meat only a few times a month in very small amounts. · Limit sweets and desserts to only a few times a week. This includes sugar-sweetened drinks like soda. The Mediterranean diet may also include red wine with your meal1 glass each day for women and up to 2 glasses a day for men. Tips for eating at home · Use herbs, spices, garlic, lemon zest, and citrus juice instead of salt to add flavor to foods. · Add avocado slices to your sandwich instead of faria. · Have fish for lunch or dinner instead of red meat. Brush the fish with olive oil, and broil or grill it. · Sprinkle your salad with seeds or nuts instead of cheese. · Cook with olive or canola oil instead of butter or oils that are high in saturated fat. · Switch from 2% milk or whole milk to 1% or fat-free milk. · Dip raw vegetables in a vinaigrette dressing or hummus instead of dips made from mayonnaise or sour cream. 
· Have a piece of fruit for dessert instead of a piece of cake. Try baked apples, or have some dried fruit. Tips for eating out · Try broiled, grilled, baked, or poached fish instead of having it fried or breaded. · Ask your  to have your meals prepared with olive oil instead of butter. · Order dishes made with marinara sauce or sauces made from olive oil. Avoid sauces made from cream or mayonnaise. · Choose whole-grain breads, whole wheat pasta and pizza crust, brown rice, beans, and lentils. · Cut back on butter or margarine on bread.  Instead, you can dip your bread in a small amount of olive oil. · Ask for a side salad or grilled vegetables instead of french fries or chips. Where can you learn more? Go to http://www.gray.com/ Enter 716-729-5072 in the search box to learn more about \"Learning About the Mediterranean Diet. \" Current as of: August 22, 2019               Content Version: 12.5 © 4291-3769 [employer] Care instructions adapted under license by [employer] (which disclaims liability or warranty for this information). If you have questions about a medical condition or this instruction, always ask your healthcare professional. [address] any warranty or liability for your use of this information.
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
## 2021-01-13 NOTE — TELEPHONE ENCOUNTER
----- Message from Ijeoma Shannon sent at 1/13/2021  9:48 AM EST -----  Regarding: Dr. Shannan Mir (if not patient): Michael Young      Relationship of caller (if not patient): Spouse      Best contact number(s): [phone]      Name of medication and dosage if known:memantine (Namenda) 10 mg tablet       Is patient out of this medication (yes/no): 1 left      Pharmacy name: [address] [place] [address] listed in chart? (yes/no): Yes  Pharmacy phone [phone]      Details to clarify the request: Pt wife calling requesting refill for his Namenda 10mg tab. Please advise.       Ijeoma Shannon

## 2021-01-19 RX ORDER — MEMANTINE HYDROCHLORIDE 10 MG/1
10 TABLET ORAL 2 TIMES DAILY
Qty: 60 TAB | Refills: 0 | Status: SHIPPED | OUTPATIENT
Start: 2021-01-19 | End: 2021-02-17 | Stop reason: SDUPTHER

## 2021-01-19 NOTE — TELEPHONE ENCOUNTER
Pt's wife pt calling back about namenda, pt is out of medication, is scheduled for f/u on 2/17. Please send in medication to [place] on [address] [facility] [address].

## 2021-02-17 ENCOUNTER — OFFICE VISIT (OUTPATIENT)
Dept: NEUROLOGY | Age: 86
End: 2021-02-17
Payer: MEDICARE

## 2021-02-17 VITALS
SYSTOLIC BLOOD PRESSURE: 124 MMHG | RESPIRATION RATE: 18 BRPM | OXYGEN SATURATION: 98 % | HEART RATE: 75 BPM | DIASTOLIC BLOOD PRESSURE: 72 MMHG

## 2021-02-17 DIAGNOSIS — F02.80 LATE ONSET ALZHEIMER'S DISEASE WITHOUT BEHAVIORAL DISTURBANCE (HCC): Primary | ICD-10-CM

## 2021-02-17 DIAGNOSIS — G30.1 LATE ONSET ALZHEIMER'S DISEASE WITHOUT BEHAVIORAL DISTURBANCE (HCC): Primary | ICD-10-CM

## 2021-02-17 DIAGNOSIS — R26.81 GAIT INSTABILITY: ICD-10-CM

## 2021-02-17 PROCEDURE — G8427 DOCREV CUR MEDS BY ELIG CLIN: HCPCS | Performed by: PSYCHIATRY & NEUROLOGY

## 2021-02-17 PROCEDURE — G8420 CALC BMI NORM PARAMETERS: HCPCS | Performed by: PSYCHIATRY & NEUROLOGY

## 2021-02-17 PROCEDURE — G8432 DEP SCR NOT DOC, RNG: HCPCS | Performed by: PSYCHIATRY & NEUROLOGY

## 2021-02-17 PROCEDURE — 99214 OFFICE O/P EST MOD 30 MIN: CPT | Performed by: PSYCHIATRY & NEUROLOGY

## 2021-02-17 PROCEDURE — 1101F PT FALLS ASSESS-DOCD LE1/YR: CPT | Performed by: PSYCHIATRY & NEUROLOGY

## 2021-02-17 PROCEDURE — G8536 NO DOC ELDER MAL SCRN: HCPCS | Performed by: PSYCHIATRY & NEUROLOGY

## 2021-02-17 RX ORDER — MEMANTINE HYDROCHLORIDE 10 MG/1
10 TABLET ORAL 2 TIMES DAILY
Qty: 180 TAB | Refills: 1 | Status: SHIPPED | OUTPATIENT
Start: 2021-02-17 | End: 2021-08-18 | Stop reason: SDUPTHER

## 2021-02-17 NOTE — PROGRESS NOTES
Neurology Clinic Follow up Note    Patient ID:  Javon Borja  [id number]  85 y.o.  1935      Mr. Borja is here for follow up today of dementia     Previous records (physician notes, laboratory reports, and radiology reports) and imaging studies were reviewed and summarized. My recommendations will be communicated back to the patient's physician(s) via electronic medical record and/or by US mail.     Last Appointment With Me:  6/17/2020    Interval History:   Pt here with his daughter today.  He reports long term memory is intact.  Still having some difficulty with short term recall. Family reports memory is stable from last visit. They deny any agitation/behavioral concerns.   He has resumed Namenda since last visit-he appears to be tolerating this well presently.   He denies further falls since last visit.  Family has noted some imbalance at times.    He reports insomnia is improved.      Ability to function:  Driving: not driving  Finances: handled by his wife now  Cooking: no  Manages own medication: handled by family  Residing: at home with his wife and/or daughter, 24h assistance    PMHx/ PSHx/ FHx/ SHx:  Reviewed and unchanged previous visit.   Past Medical History:   Diagnosis Date   • Arthritis     LEFT SHOULDER   • Dementia (HCC)          ROS:  Comprehensive review of systems negative except for as noted above.       Objective:       Meds:  Current Outpatient Medications   Medication Sig Dispense Refill   • memantine (Namenda) 10 mg tablet Take 1 Tab by mouth two (2) times a day. Take 1/2 tablet BID x 1 week, then increase to 1 tablet BID 60 Tab 0   • triamcinolone acetonide (KENALOG) 0.1 % ointment      • ergocalciferol (ERGOCALCIFEROL) 50,000 unit capsule Take 1 Cap by mouth every seven (7) days. Indications: Vitamin D Deficiency (High Dose Therapy) 12 Cap 0         Exam:  Visit Vitals  /72   Pulse 75   Resp 18   SpO2 98%     NEUROLOGICAL EXAM:  General: Awake, alert, speech fluent.  Disoriented  to date/month/year. Knows POTUS. Difficulty with serial 7's. Impaired delayed recall. CN: PERRL, EOM-slight dysconjugate gaze, VFF to confrontation, facial sensation and strength are normal and symmetric, hearing is intact to finger rub bilaterally, palate and tongue movements are intact and symmetric. Motor: LUE prox strength limited due to shoulder pain, otherwise 5/5. Normal tone, bulk and strength bilaterally. Reflexes: 1/4 and symmetric, plantar stimulation is flexor. Coordination: FNF, GIO, HTS intact. Sensation: LT intact throughout. Gait: Normal-based, slightly unsteady       LABS  Results for orders placed or performed in visit on 12/05/19   CBC WITH AUTOMATED DIFF   Result Value Ref Range    WBC 5.8 3.4 - 10.8 x10E3/uL    RBC 4.39 4.14 - 5.80 x10E6/uL    HGB 13.0 13.0 - 17.7 g/dL    HCT 38.4 37.5 - 51.0 %    MCV 88 79 - 97 fL    MCH 29.6 26.6 - 33.0 pg    MCHC 33.9 31.5 - 35.7 g/dL    RDW 11.6 (L) 12.3 - 15.4 %    PLATELET 801 256 - 770 x10E3/uL    NEUTROPHILS 74 Not Estab. %    Lymphocytes 16 Not Estab. %    MONOCYTES 7 Not Estab. %    EOSINOPHILS 2 Not Estab. %    BASOPHILS 1 Not Estab. %    ABS. NEUTROPHILS 4.3 1.4 - 7.0 x10E3/uL    Abs Lymphocytes 1.0 0.7 - 3.1 x10E3/uL    ABS. MONOCYTES 0.4 0.1 - 0.9 x10E3/uL    ABS. EOSINOPHILS 0.1 0.0 - 0.4 x10E3/uL    ABS. BASOPHILS 0.0 0.0 - 0.2 x10E3/uL    IMMATURE GRANULOCYTES 0 Not Estab. %    ABS. IMM.  GRANS. 0.0 0.0 - 0.1 [id number]   METABOLIC PANEL, COMPREHENSIVE   Result Value Ref Range    Glucose 94 65 - 99 mg/dL    BUN 8 8 - 27 mg/dL    Creatinine 1.00 0.76 - 1.27 mg/dL    GFR est non-AA 69 >59 mL/min/1.73    GFR est AA 80 >59 mL/min/1.73    BUN/Creatinine ratio 8 (L) 10 - 24    Sodium 142 134 - 144 mmol/L    Potassium 3.6 3.5 - 5.2 mmol/L    Chloride 103 96 - 106 mmol/L    CO2 24 20 - 29 mmol/L    Calcium 9.1 8.6 - 10.2 mg/dL    Protein, total 6.7 6.0 - 8.5 g/dL    Albumin 4.2 3.5 - 4.7 g/dL    GLOBULIN, TOTAL 2.5 1.5 - 4.5 g/dL    A-G Ratio 1.7 1.2 - 2.2    Bilirubin, total 1.0 0.0 - 1.2 mg/dL    Alk. phosphatase 135 (H) 39 - 117 IU/L    AST (SGOT) 17 0 - 40 IU/L    ALT (SGPT) 10 0 - 44 IU/L   LIPID PANEL   Result Value Ref Range    Cholesterol, total 156 100 - 199 mg/dL    Triglyceride 50 0 - 149 mg/dL    HDL Cholesterol 50 >39 mg/dL    VLDL, calculated 10 5 - 40 mg/dL    LDL, calculated 96 0 - 99 mg/dL   UA WITH REFLEX MICRO AND CULTURE    Specimen: Urine   Result Value Ref Range    Specific Gravity 1.020 1.005 - 1.030    pH (UA) 7.0 5.0 - 7.5    Color Yellow Yellow    Appearance Clear Clear    Leukocyte Esterase Negative Negative    Protein Trace Negative/Trace    Glucose Negative Negative    Ketone Negative Negative    Blood Negative Negative    Bilirubin Negative Negative    Urobilinogen 1.0 0.2 - 1.0 mg/dL    Nitrites Negative Negative    Microscopic Examination Comment     Microscopic exam See additional order     URINALYSIS REFLEX Comment    VITAMIN D, 25 HYDROXY   Result Value Ref Range    VITAMIN D, 25-HYDROXY 18.7 (L) 30.0 - 100.0 ng/mL   TSH RFX ON ABNORMAL TO FREE T4   Result Value Ref Range    TSH 1.630 0.450 - 4.500 uIU/mL   MICROSCOPIC EXAMINATION   Result Value Ref Range    WBC 0-5 0 - 5 /hpf    RBC 0-2 0 - 2 /hpf    Epithelial cells None seen 0 - 10 /hpf    Casts None seen None seen /lpf    Mucus Present Not Estab. Bacteria None seen None seen/Few   CVD REPORT   Result Value Ref Range    INTERPRETATION Note        Assessment:     Encounter Diagnoses     ICD-10-CM ICD-9-CM   1. Late onset Alzheimer's disease without behavioral disturbance (HCC)  G30.1 331.0    F02.80 294.10   2. Gait instability  R26.81 781.2     81 y.o. male former  here for f/u of progressive cognitive decline x 7-8 years previously followed at [address] [place] [address]. [address] [place] [address] 16/30 with significantly impaired delayed recall, orientation, language, visuospatial/executive functioning. More mild inattention/naming deficits noted.   Examination is otherwise non-focal.  Head CT from 1/2018 reviewed without significant abnormalities. [facility] records reviewed- MRI Brain previously performed and without acute abnormalities. Findings are supportive of a moderate dementia without behavioral disturbance, probable AD. Explained that AD is a progressive disease process. Discussed treatment options in detail including risks/benefits and expectations. While medication is not likely to made a \"night and day\" difference, it may aid modestly in improving concentration and attention. Medication titration and addition of adjunctive agents may be necessary to achieve maximum benefit. Acetylcholinesterase inhibitors and NMDA receptor antagonist may be used to assist with attention and recall. He has tried Aricept in the past with reported side effects/lightheadedness. He has resumed Namenda since his last visit and appears to be doing well with this currently. Discussed that he would benefit from designation of a POA to assist with executive decision making. Finances and medication administration should be monitored to ensure accuracy and prevent errors. He should continue to abstain from driving. Discussed biking safety and preference for stationary biking due to difficulties with navigation and visuospatial dysfunction on examination. He is currently residing independently with his wife who works part-time. He has 24h supervision with the assistance of his wife and daughter. Discussed fall precautions due to gait instability and possible physical therapy to address this. Plan:   Continue Namenda 10mg BID  Heart healthy diet and exercise  Regular scheduled cognitive and social engagement. Follow-up and Dispositions    · Return in about 6 months (around 8/17/2021). I have discussed the diagnosis with the patient today and the intended plan as seen in the above orders with both the patient as well as referring provider and/or PCP via electronic correspondence.  The patient has received an after-visit summary and questions were answered concerning future plans. I have discussed medication side effects and warnings with the patient as well.           Signed:  Amador Gan DO  2/17/2021

## 2021-02-19 ENCOUNTER — TELEPHONE (OUTPATIENT)
Dept: NEUROLOGY | Age: 86
End: 2021-02-19

## 2021-02-22 NOTE — TELEPHONE ENCOUNTER
Spoke with Mrs. Jenny Chavez, informed her patient's PCP was previously prescribing medication. She states that provider has left, but will call the practice to see if they will refill.

## 2021-03-05 ENCOUNTER — TELEPHONE (OUTPATIENT)
Dept: FAMILY MEDICINE CLINIC | Age: 86
End: 2021-03-05

## 2021-06-01 NOTE — TELEPHONE ENCOUNTER
Requested Prescriptions     Pending Prescriptions Disp Refills    memantine (Namenda) 10 mg tablet 180 Tablet 1     Sig: Take 1 Tablet by mouth two (2) times a day.

## 2021-06-02 NOTE — TELEPHONE ENCOUNTER
----- Message from Jessica Brown sent at 6/2/2021  4:22 PM EDT -----  Regarding: /telephone  Medication Refill    Caller (if not patient): The pt's wife. Relationship of caller (if not patient): Miguel Gray contact number(s):[phone], [phone]. Name of medication and dosage if known:memantine (Namenda) 10 mg tablet  Is patient out of this medication (yes/no):Yes      Pharmacy name:[facility] on [address] [facility] Pharmacy listed in chart? (yes/no):Yes.

## 2021-06-04 RX ORDER — MEMANTINE HYDROCHLORIDE 10 MG/1
10 TABLET ORAL 2 TIMES DAILY
Qty: 180 TABLET | Refills: 1 | OUTPATIENT
Start: 2021-06-04

## 2021-06-04 NOTE — TELEPHONE ENCOUNTER
Pt's wife calling stating at last refill the pharmacy gave her 3 bottles and she misplaced one of the bottles and the patient is now out

## 2021-08-18 ENCOUNTER — OFFICE VISIT (OUTPATIENT)
Dept: NEUROLOGY | Age: 86
End: 2021-08-18
Payer: MEDICARE

## 2021-08-18 VITALS
SYSTOLIC BLOOD PRESSURE: 136 MMHG | DIASTOLIC BLOOD PRESSURE: 90 MMHG | HEIGHT: 73 IN | OXYGEN SATURATION: 100 % | HEART RATE: 82 BPM | WEIGHT: 157 LBS | BODY MASS INDEX: 20.81 KG/M2 | RESPIRATION RATE: 16 BRPM

## 2021-08-18 DIAGNOSIS — G30.1 LATE ONSET ALZHEIMER'S DISEASE WITHOUT BEHAVIORAL DISTURBANCE (HCC): Primary | ICD-10-CM

## 2021-08-18 DIAGNOSIS — R26.81 GAIT INSTABILITY: ICD-10-CM

## 2021-08-18 DIAGNOSIS — F02.80 LATE ONSET ALZHEIMER'S DISEASE WITHOUT BEHAVIORAL DISTURBANCE (HCC): Primary | ICD-10-CM

## 2021-08-18 PROCEDURE — G8536 NO DOC ELDER MAL SCRN: HCPCS | Performed by: PSYCHIATRY & NEUROLOGY

## 2021-08-18 PROCEDURE — G8427 DOCREV CUR MEDS BY ELIG CLIN: HCPCS | Performed by: PSYCHIATRY & NEUROLOGY

## 2021-08-18 PROCEDURE — 99214 OFFICE O/P EST MOD 30 MIN: CPT | Performed by: PSYCHIATRY & NEUROLOGY

## 2021-08-18 PROCEDURE — G8420 CALC BMI NORM PARAMETERS: HCPCS | Performed by: PSYCHIATRY & NEUROLOGY

## 2021-08-18 PROCEDURE — 1101F PT FALLS ASSESS-DOCD LE1/YR: CPT | Performed by: PSYCHIATRY & NEUROLOGY

## 2021-08-18 PROCEDURE — G8510 SCR DEP NEG, NO PLAN REQD: HCPCS | Performed by: PSYCHIATRY & NEUROLOGY

## 2021-08-18 RX ORDER — MEMANTINE HYDROCHLORIDE 10 MG/1
10 TABLET ORAL 2 TIMES DAILY
Qty: 180 TABLET | Refills: 1 | Status: SHIPPED | OUTPATIENT
Start: 2021-08-18 | End: 2022-03-03 | Stop reason: SDUPTHER

## 2021-08-18 NOTE — PROGRESS NOTES
Neurology Clinic Follow up Note    Patient ID:  Lauren Deleon  [id number]  89 y.o.  1935      Mr. Sakshi Escobar is here for follow up today of dementia     Previous records (physician notes, laboratory reports, and radiology reports) and imaging studies were reviewed and summarized. My recommendations will be communicated back to the patient's physician(s) via electronic medical record and/or by [address] mail. Last Appointment With Me:  2/17/2021    Interval History:   Still having some difficulty with short term recall. Family reports memory has declined slightly since last visit with more repetition. They deny any agitation/behavioral concerns. He is taking Namenda as prescribed-he appears to be tolerating this well presently. He denies further falls since last visit. Family has noted some imbalance at times. He reports insomnia is improved. Ability to function:  Driving: not driving  Finances: handled by his wife now  Cooking: no  Manages own medication: handled by family  Residing: at home with his wife and/or daughter, 24h assistance    PMHx/ PSHx/ FHx/ SHx:  Reviewed and unchanged previous visit. Past Medical History:   Diagnosis Date    Arthritis     LEFT SHOULDER    Dementia ([facility] [address]          ROS:  Comprehensive review of systems negative except for as noted above. Objective:       Meds:  Current Outpatient Medications   Medication Sig Dispense Refill    memantine (Namenda) 10 mg tablet Take 1 Tab by mouth two (2) times a day. 180 Tab 1         Exam:  Visit Vitals  BP (!) 136/90 (BP 1 Location: Right arm, BP Patient Position: Sitting)   Pulse 82   Resp 16   Ht 6' 1\" (1.854 m)   Wt 157 lb (71.2 kg)   SpO2 100%   BMI 20.71 kg/m²     NEUROLOGICAL EXAM:  General: Awake, alert, speech fluent. Disoriented to date/month/year. Knows POTUS. Difficulty with serial 7's. Impaired delayed recall.    CN: PERRL, EOM-slight dysconjugate gaze, VFF to confrontation, facial sensation and strength are normal and symmetric, hearing is intact to finger rub bilaterally, palate and tongue movements are intact and symmetric. Motor: LUE prox strength limited due to shoulder pain, otherwise 5/5. Normal tone, bulk and strength bilaterally. Reflexes: Deferred  Coordination: FNF, GIO, HTS intact. Sensation: LT intact throughout. Gait: Normal-based, slightly unsteady       LABS  Results for orders placed or performed in visit on 12/05/19   CBC WITH AUTOMATED DIFF   Result Value Ref Range    WBC 5.8 3.4 - 10.8 x10E3/uL    RBC 4.39 4.14 - 5.80 x10E6/uL    HGB 13.0 13.0 - 17.7 g/dL    HCT 38.4 37.5 - 51.0 %    MCV 88 79 - 97 fL    MCH 29.6 26.6 - 33.0 pg    MCHC 33.9 31.5 - 35.7 g/dL    RDW 11.6 (L) 12.3 - 15.4 %    PLATELET 030 609 - 646 x10E3/uL    NEUTROPHILS 74 Not Estab. %    Lymphocytes 16 Not Estab. %    MONOCYTES 7 Not Estab. %    EOSINOPHILS 2 Not Estab. %    BASOPHILS 1 Not Estab. %    ABS. NEUTROPHILS 4.3 1.4 - 7.0 x10E3/uL    Abs Lymphocytes 1.0 0.7 - 3.1 x10E3/uL    ABS. MONOCYTES 0.4 0.1 - 0.9 x10E3/uL    ABS. EOSINOPHILS 0.1 0.0 - 0.4 x10E3/uL    ABS. BASOPHILS 0.0 0.0 - 0.2 x10E3/uL    IMMATURE GRANULOCYTES 0 Not Estab. %    ABS. IMM. GRANS. 0.0 0.0 - 0.1 X27N7/VC   METABOLIC PANEL, COMPREHENSIVE   Result Value Ref Range    Glucose 94 65 - 99 mg/dL    BUN 8 8 - 27 mg/dL    Creatinine 1.00 0.76 - 1.27 mg/dL    GFR est non-AA 69 >59 mL/min/1.73    GFR est AA 80 >59 mL/min/1.73    BUN/Creatinine ratio 8 (L) 10 - 24    Sodium 142 134 - 144 mmol/L    Potassium 3.6 3.5 - 5.2 mmol/L    Chloride 103 96 - 106 mmol/L    CO2 24 20 - 29 mmol/L    Calcium 9.1 8.6 - 10.2 mg/dL    Protein, total 6.7 6.0 - 8.5 g/dL    Albumin 4.2 3.5 - 4.7 g/dL    GLOBULIN, TOTAL 2.5 1.5 - 4.5 g/dL    A-G Ratio 1.7 1.2 - 2.2    Bilirubin, total 1.0 0.0 - 1.2 mg/dL    Alk.  phosphatase 135 (H) 39 - 117 IU/L    AST (SGOT) 17 0 - 40 IU/L    ALT (SGPT) 10 0 - 44 IU/L   LIPID PANEL   Result Value Ref Range    Cholesterol, total 156 100 - 199 mg/dL Triglyceride 50 0 - 149 mg/dL    HDL Cholesterol 50 >39 mg/dL    VLDL, calculated 10 5 - 40 mg/dL    LDL, calculated 96 0 - 99 mg/dL   UA WITH REFLEX MICRO AND CULTURE    Specimen: Urine   Result Value Ref Range    Specific Gravity 1.020 1.005 - 1.030    pH (UA) 7.0 5.0 - 7.5    Color Yellow Yellow    Appearance Clear Clear    Leukocyte Esterase Negative Negative    Protein Trace Negative/Trace    Glucose Negative Negative    Ketone Negative Negative    Blood Negative Negative    Bilirubin Negative Negative    Urobilinogen 1.0 0.2 - 1.0 mg/dL    Nitrites Negative Negative    Microscopic Examination Comment     Microscopic exam See additional order     URINALYSIS REFLEX Comment    VITAMIN D, 25 HYDROXY   Result Value Ref Range    VITAMIN D, 25-HYDROXY 18.7 (L) 30.0 - 100.0 ng/mL   TSH RFX ON ABNORMAL TO FREE T4   Result Value Ref Range    TSH 1.630 0.450 - 4.500 uIU/mL   MICROSCOPIC EXAMINATION   Result Value Ref Range    WBC 0-5 0 - 5 /hpf    RBC 0-2 0 - 2 /hpf    Epithelial cells None seen 0 - 10 /hpf    Casts None seen None seen /lpf    Mucus Present Not Estab. Bacteria None seen None seen/Few   CVD REPORT   Result Value Ref Range    INTERPRETATION Note        Assessment:     Encounter Diagnoses     ICD-10-CM ICD-9-CM   1. Late onset Alzheimer's disease without behavioral disturbance (HCC)  G30.1 331.0    F02.80 294.10   2. Gait instability  R26.81 781. 2     81 y.o. male former  here for f/u of progressive cognitive decline x 7-8 years previously followed at [address] [place] [address]. [address] [place] [address] 16/30 with significantly impaired delayed recall, orientation, language, visuospatial/executive functioning. More mild inattention/naming deficits noted. Examination is otherwise non-focal.  Head CT from 1/2018 reviewed without significant abnormalities. [facility] records reviewed- MRI Brain previously performed and without acute abnormalities. Findings are supportive of a moderate dementia without behavioral disturbance, probable AD. Explained that AD is a progressive disease process. Discussed treatment options in detail including risks/benefits and expectations. While medication is not likely to made a \"night and day\" difference, it may aid modestly in improving concentration and attention. Medication titration and addition of adjunctive agents may be necessary to achieve maximum benefit. Acetylcholinesterase inhibitors and NMDA receptor antagonist may be used to assist with attention and recall. He has tried Aricept in the past with reported side effects/lightheadedness. He has resumed Namenda since his last visit and appears to be doing well with this currently. Discussed that he would benefit from designation of a POA to assist with executive decision making. Finances and medication administration should be monitored to ensure accuracy and prevent errors. He should continue to abstain from driving. Discussed biking safety and preference for stationary biking due to difficulties with navigation and visuospatial dysfunction on examination. He is currently residing independently with his wife who works part-time. He has 24h supervision with the assistance of his wife and daughter. Discussed fall precautions due to gait instability and possible physical therapy to address this. Plan:   Continue Namenda 10mg BID  Heart healthy diet and exercise  Regular scheduled cognitive and social engagement. Follow-up and Dispositions    · Return in about 6 months (around 2/18/2022). I have discussed the diagnosis with the patient today and the intended plan as seen in the above orders with both the patient as well as referring provider and/or PCP via electronic correspondence. The patient has received an after-visit summary and questions were answered concerning future plans. I have discussed medication side effects and warnings with the patient as well.           Signed:  Neville Ackerman DO  8/18/2021

## 2021-08-18 NOTE — PROGRESS NOTES
patient wife states sometimes it look as if the patient is about to lose his balance. patient wife states the patient memory has gotten a little worse. no other concerns at this time. .  Chief Complaint   Patient presents with    Follow-up     patient wife states sometimes it look as if the patient is about to lose his balance.  Memory Loss     patient wife states the patient memory has gotten a little worse. no other concerns at this time.      .  Visit Vitals  BP (!) 136/90 (BP 1 Location: Right arm, BP Patient Position: Sitting)   Pulse 82   Resp 16   Ht 6' 1\" (1.854 m)   Wt 157 lb (71.2 kg)   SpO2 100%   BMI 20.71 kg/m²

## 2022-01-19 ENCOUNTER — OFFICE VISIT (OUTPATIENT)
Dept: FAMILY MEDICINE CLINIC | Age: 87
End: 2022-01-19
Payer: MEDICARE

## 2022-01-19 VITALS
WEIGHT: 149.6 LBS | RESPIRATION RATE: 16 BRPM | HEART RATE: 60 BPM | BODY MASS INDEX: 19.83 KG/M2 | SYSTOLIC BLOOD PRESSURE: 148 MMHG | OXYGEN SATURATION: 99 % | HEIGHT: 73 IN | DIASTOLIC BLOOD PRESSURE: 66 MMHG | TEMPERATURE: 97.5 F

## 2022-01-19 DIAGNOSIS — E55.9 VITAMIN D DEFICIENCY: ICD-10-CM

## 2022-01-19 DIAGNOSIS — F02.80 LATE ONSET ALZHEIMER'S DEMENTIA WITHOUT BEHAVIORAL DISTURBANCE (HCC): ICD-10-CM

## 2022-01-19 DIAGNOSIS — Z79.899 ENCOUNTER FOR LONG-TERM (CURRENT) USE OF MEDICATIONS: ICD-10-CM

## 2022-01-19 DIAGNOSIS — G30.1 LATE ONSET ALZHEIMER'S DEMENTIA WITHOUT BEHAVIORAL DISTURBANCE (HCC): ICD-10-CM

## 2022-01-19 DIAGNOSIS — Z13.220 SCREENING CHOLESTEROL LEVEL: ICD-10-CM

## 2022-01-19 DIAGNOSIS — I49.3 FREQUENT PVCS: ICD-10-CM

## 2022-01-19 DIAGNOSIS — Z13.1 SCREENING FOR DIABETES MELLITUS: ICD-10-CM

## 2022-01-19 DIAGNOSIS — I49.9 IRREGULAR HEART RHYTHM: Primary | ICD-10-CM

## 2022-01-19 DIAGNOSIS — Z76.89 ESTABLISHING CARE WITH NEW DOCTOR, ENCOUNTER FOR: ICD-10-CM

## 2022-01-19 PROCEDURE — 93005 ELECTROCARDIOGRAM TRACING: CPT | Performed by: FAMILY MEDICINE

## 2022-01-19 PROCEDURE — 1101F PT FALLS ASSESS-DOCD LE1/YR: CPT | Performed by: FAMILY MEDICINE

## 2022-01-19 PROCEDURE — G8420 CALC BMI NORM PARAMETERS: HCPCS | Performed by: FAMILY MEDICINE

## 2022-01-19 PROCEDURE — G8536 NO DOC ELDER MAL SCRN: HCPCS | Performed by: FAMILY MEDICINE

## 2022-01-19 PROCEDURE — 93010 ELECTROCARDIOGRAM REPORT: CPT | Performed by: FAMILY MEDICINE

## 2022-01-19 PROCEDURE — G8510 SCR DEP NEG, NO PLAN REQD: HCPCS | Performed by: FAMILY MEDICINE

## 2022-01-19 PROCEDURE — G8427 DOCREV CUR MEDS BY ELIG CLIN: HCPCS | Performed by: FAMILY MEDICINE

## 2022-01-19 PROCEDURE — 99203 OFFICE O/P NEW LOW 30 MIN: CPT | Performed by: FAMILY MEDICINE

## 2022-01-19 PROCEDURE — G0463 HOSPITAL OUTPT CLINIC VISIT: HCPCS | Performed by: FAMILY MEDICINE

## 2022-01-19 RX ORDER — ERGOCALCIFEROL 1.25 MG/1
50000 CAPSULE ORAL
Qty: 12 CAPSULE | Refills: 1 | Status: SHIPPED | OUTPATIENT
Start: 2022-01-19 | End: 2022-07-26

## 2022-01-19 NOTE — PROGRESS NOTES
Keaton Saunders is a 81 y.o. male, who's a new patient to our practice. Present wit his wife. Previous PCP: Lizabeth Lai NP office closed  Last seen there: 2 years ago    Vaccinated for Kevin       has a past medical history of Arthritis and Dementia ([facility] [address] On exam  Pulse irregular  Denies syncope, dizziness, light headedness   EKG showed Sinus with frequent ectopic ventricular beat; 20 PVC  ? considering low dose beta blockers - vs referral  Refer to cardio    Neuro Dr. Lea Swan - notes reviewed  Late onset Alzheimer's Dementia  Gait instability  Namenda    Ability to function:  Driving: not driving  Finances: handled by his wife now  Cooking: no  Manages own medication: handled by family  Residing: at home with his wife and/or daughter, 24h assistance    Denies LUTS  He denies any concerns, pain or issues. Wife just wanted a routine check up      Reviewed: active problem list, medication list, allergies, family history, social history, health maintenance, notes from last encounter, lab results    A comprehensive review of systems was negative except for that written in the HPI, on 14 ROS. No Known Allergies  Current Outpatient Medications on File Prior to Visit   Medication Sig Dispense Refill    memantine (Namenda) 10 mg tablet Take 1 Tablet by mouth two (2) times a day. 180 Tablet 1     No current facility-administered medications on file prior to visit.      Patient Active Problem List   Diagnosis Code    ORLIN (acute kidney injury) ([facility] [address] N17.9    Abdominal mass R19.00    BPH (benign prostatic hyperplasia) N40.0    Hoarseness R49.0    Weight loss R63.4    Elevated alkaline phosphatase level R74.8    Vitamin D deficiency E55.9    Other hyperlipidemia E78.49    Alzheimer's dementia without behavioral disturbance (HCC) G30.9, F02.80       Visit Vitals  BP (!) 148/66   Pulse 60   Temp 97.5 °F (36.4 °C) (Temporal)   Resp 16   Ht 6' 1\" (1.854 m)   Wt 149 lb 9.6 oz (67.9 kg)   SpO2 99%   BMI 19.74 kg/m²     General appearance: alert, cooperative, no distress, appears stated age  Neurologic: Alert and oriented X 3, normal strength and tone, symmetric. Normal without focal findings. Cranial nerves 2-12 intact. Normal coordination and gait. Mental status: Alert, oriented, thought content appropriate, affect: stable, mood-congruent. Head: Normocephalic, without obvious abnormality, atraumatic  Eyes: conjunctivae/corneas clear. PERRL, EOM's intact. Neck: supple, symmetrical, trachea midline, no JVD  Lungs: clear to auscultation bilaterally  Heart: Pulse Irregular rhythm, normal rate. Heart sound faint. Abdomen: soft, non-tender. Extremities: extremities normal, atraumatic, no cyanosis or edema      Assessment/Plans:    Diagnoses and all orders for this visit:    1. Irregular heart rhythm  -     CBC W/O DIFF; Future  -     METABOLIC PANEL, COMPREHENSIVE; Future  -     TSH 3RD GENERATION; Future  -     REFERRAL TO CARDIOLOGY  -     MAGNESIUM; Future    2. Frequent PVCs  -     REFERRAL TO CARDIOLOGY  -     MAGNESIUM; Future    3. Establishing care with new doctor, encounter for    4. Late onset Alzheimer's dementia without behavioral disturbance (HCC)  -     METABOLIC PANEL, COMPREHENSIVE; Future  -     TSH 3RD GENERATION; Future    5. Vitamin D deficiency  -     ergocalciferol (ERGOCALCIFEROL) 1,250 mcg (50,000 unit) capsule; Take 1 Capsule by mouth every seven (7) days. 6. Encounter for long-term (current) use of medications  -     CBC W/O DIFF; Future  -     METABOLIC PANEL, COMPREHENSIVE; Future  -     TSH 3RD GENERATION; Future  -     LIPID PANEL; Future  -     HEMOGLOBIN A1C WITH EAG; Future  -     AMB POC EKG ROUTINE W/ 12 LEADS, INTER & REP    7. Screening cholesterol level  -     LIPID PANEL; Future    8. Screening for diabetes mellitus  -     HEMOGLOBIN A1C WITH EAG; Future      Discussed plans, risk/benefits of treatments/observations.    Through the use of shared decision making, above plans were agreed upon. Medication compliance advised. Patient verbalized understanding. Follow-up and Dispositions    · Return in about 2 weeks (around 2/2/2022) for labs, medicare wellness .            Frank Tucker MD  1/19/2022

## 2022-02-02 ENCOUNTER — OFFICE VISIT (OUTPATIENT)
Dept: FAMILY MEDICINE CLINIC | Age: 87
End: 2022-02-02
Payer: MEDICARE

## 2022-02-02 VITALS
HEIGHT: 73 IN | BODY MASS INDEX: 19.64 KG/M2 | SYSTOLIC BLOOD PRESSURE: 140 MMHG | WEIGHT: 148.2 LBS | RESPIRATION RATE: 18 BRPM | HEART RATE: 88 BPM | DIASTOLIC BLOOD PRESSURE: 73 MMHG | TEMPERATURE: 98.9 F | OXYGEN SATURATION: 99 %

## 2022-02-02 DIAGNOSIS — Z00.00 MEDICARE ANNUAL WELLNESS VISIT, SUBSEQUENT: Primary | ICD-10-CM

## 2022-02-02 PROCEDURE — G0439 PPPS, SUBSEQ VISIT: HCPCS | Performed by: FAMILY MEDICINE

## 2022-02-02 PROCEDURE — 1101F PT FALLS ASSESS-DOCD LE1/YR: CPT | Performed by: FAMILY MEDICINE

## 2022-02-02 PROCEDURE — G8420 CALC BMI NORM PARAMETERS: HCPCS | Performed by: FAMILY MEDICINE

## 2022-02-02 PROCEDURE — G8510 SCR DEP NEG, NO PLAN REQD: HCPCS | Performed by: FAMILY MEDICINE

## 2022-02-02 PROCEDURE — G8536 NO DOC ELDER MAL SCRN: HCPCS | Performed by: FAMILY MEDICINE

## 2022-02-02 PROCEDURE — G8427 DOCREV CUR MEDS BY ELIG CLIN: HCPCS | Performed by: FAMILY MEDICINE

## 2022-02-02 NOTE — Clinical Note
NOTIFICATION RETURN TO WORK / SCHOOL    2/2/2022 2:56 PM    Mr. Mary Soto  [address] [place] [address]      To Whom It May Concern:    Mary Soto is currently under the care of Sarah Ayala. He will return to work/school on: ***    If there are questions or concerns please have the patient contact our office.         Sincerely,      Devora Medina MD

## 2022-02-02 NOTE — PROGRESS NOTES
This is a [facility] Exam (AWV)     I have reviewed the patient's medical history in detail and updated the computerized patient record. Laura November is a 81 y.o. male    2nd visit with this physician,     Vaccinated for COVID       has a past medical history of Arthritis and Dementia ([facility] Labs reviewed w pt. Neuro Dr. Hansa Kang - notes reviewed  Late onset Alzheimer's Dementia  Gait instability  Namenda     Ability to function:  Driving: not driving  Finances: handled by his wife now  Cooking: no  Manages own medication: handled by family  Residing: at home with his wife and/or daughter, 24h assistance     Denies LUTS  He denies any concerns, pain or issues. Wife just wanted a routine check up    On exam  Pulse irregular  Denies syncope, dizziness, light headedness   EKG showed Sinus with frequent ectopic ventricular beat; 20 PVC  ? considering low dose beta blockers - vs referral  Referred to cardio    Doesn't want any more colonoscopy      History     Past Medical History:   Diagnosis Date    Arthritis     LEFT SHOULDER    Dementia ([facility]       Past Surgical History:   Procedure Laterality Date    HX HERNIA REPAIR      HX UROLOGICAL      \"COULDN'T PASS URINE, HAD SURGERY, CAME HOME WITH CATHETER\"     Current Outpatient Medications   Medication Sig Dispense Refill    ergocalciferol (ERGOCALCIFEROL) 1,250 mcg (50,000 unit) capsule Take 1 Capsule by mouth every seven (7) days. 12 Capsule 1    memantine (Namenda) 10 mg tablet Take 1 Tablet by mouth two (2) times a day.  180 Tablet 1     No Known Allergies  Family History   Problem Relation Age of Onset    Stroke Mother     OSTEOARTHRITIS Mother     Heart Attack Father     Heart Disease Sister     Heart Attack Sister     Stroke Daughter 43    Migraines Daughter     Thyroid Disease Daughter     No Known Problems Daughter     No Known Problems Daughter     No Known Problems Daughter     No Known Problems John Fernandez Problems Neg Hx      Social History     Tobacco Use    Smoking status: Former Smoker     Quit date: 2008     Years since quittin.5    Smokeless tobacco: Never Used    Tobacco comment: CIGARS AND PIPES   Substance Use Topics    Alcohol use: Yes     Comment: WINE ONCE IN A WHILE     Patient Active Problem List   Diagnosis Code    ORLIN (acute kidney injury) ([facility] N17.9    Abdominal mass R19.00    BPH (benign prostatic hyperplasia) N40.0    Hoarseness R49.0    Weight loss R63.4    Elevated alkaline phosphatase level R74.8    Vitamin D deficiency E55.9    Other hyperlipidemia E78.49    Alzheimer's dementia without behavioral disturbance ([facility] G30.9, F02.80       Depression Risk Factor Screening:     3 most recent PHQ Screens 2022   Little interest or pleasure in doing things Not at all   Feeling down, depressed, irritable, or hopeless Not at all   Total Score PHQ 2 0       Alcohol Risk Factor Screening: You do not drink alcohol or very rarely. Functional Ability and Level of Safety:     Hearing Loss   Hearing is good. Activities of Daily Living   Self-care. Requires assistance with: no ADLs    Fall Risk     Fall Risk Assessment, last 12 mths 2022   Able to walk? Yes   Fall in past 12 months? 0   Do you feel unsteady? 0   Are you worried about falling 0   Number of falls in past 12 months -   Fall with injury? -       Abuse Screen   Patient is not abused    Review of Systems   A comprehensive review of systems was negative except for that written in the HPI.     Physical Examination     Evaluation of Cognitive Function:  Mood/affect:  neutral  Appearance: age appropriate and well dressed  Family member/caregiver input: no other concerns from wife    Visit Vitals  BP (!) 140/73   Pulse 88   Temp 98.9 °F (37.2 °C) (Temporal)   Resp 18   Ht 6' 1\" (1.854 m)   Wt 148 lb 3.2 oz (67.2 kg)   SpO2 99%   BMI 19.55 kg/m²     General appearance: alert, cooperative, no distress, appears stated age  Neurologic: Alert and oriented X 3, normal strength and tone, symmetric. Normal without focal findings. Cranial nerves 2-12 intact. Normal coordination and gait. Mental status: Alert, oriented, thought content appropriate, affect: stable, mood-congruent. Head: Normocephalic, without obvious abnormality, atraumatic  Eyes: conjunctivae/corneas clear. PERRL, EOM's intact. Neck: supple, symmetrical, trachea midline, no JVD  Lungs: clear to auscultation bilaterally  Heart: regular rate and rhythm, S1, S2 normal, no murmur, click, rub or gallop  Abdomen: soft, non-tender. Extremities: extremities normal, atraumatic, no cyanosis or edema      Patient Care Team:  Alina French MD as PCP - General (Family Medicine)  Alina French MD as PCP - [facility]  Hermann Leonard DO as Physician (Neurology)    Advice/Referrals/Counseling   Education and counseling provided:  Are appropriate based on today's review and evaluation  End-of-Life planning (with patient's consent)  Pneumococcal Vaccine  Influenza Vaccine  Colorectal cancer screening tests  Diabetes screening test    Assessment/Plan     Diagnoses and all orders for this visit:    1. Medicare annual wellness visit, subsequent    . Follow-up and Dispositions    · Return in about 6 months (around 8/2/2022) for health check. Krissy Crisostomo MD  2/2/2022.

## 2022-02-02 NOTE — PROGRESS NOTES
Chief Complaint   Patient presents with   [facility] Annual Wellness Visit       1. Have you been to the ER, urgent care clinic since your last visit? Hospitalized since your last visit? No     2. Have you seen or consulted any other health care providers outside of the [address] [place] [address] since your last visit? Include any pap smears or colon screening.  No fall precautions

## 2022-02-02 NOTE — LETTER
2/2/2022    Mr. Zain Davis  [address] [address]      Dear Zain Davis:    Please find your most recent results below. Resulted Orders   MAGNESIUM   Result Value Ref Range    Magnesium 2.3 1.6 - 2.4 mg/dL   HEMOGLOBIN A1C WITH EAG   Result Value Ref Range    Hemoglobin A1c 4.8 4.0 - 5.6 %    Est. average glucose 91 mg/dL   LIPID PANEL   Result Value Ref Range    Cholesterol, total 168 <200 MG/DL    Triglyceride 83 <150 MG/DL    HDL Cholesterol 51 MG/DL    LDL, calculated 100.4 (H) 0 - 100 MG/DL    VLDL, calculated 16.6 MG/DL    CHOL/HDL Ratio 3.3 0.0 - 5.0     TSH 3RD GENERATION   Result Value Ref Range    TSH 2.95 0.36 - 9.50 uIU/mL   METABOLIC PANEL, COMPREHENSIVE   Result Value Ref Range    Sodium 139 136 - 145 mmol/L    Potassium 3.6 3.5 - 5.1 mmol/L    Chloride 106 97 - 108 mmol/L    CO2 27 21 - 32 mmol/L    Anion gap 6 5 - 15 mmol/L    Glucose 129 (H) 65 - 100 mg/dL    BUN 9 6 - 20 MG/DL    Creatinine 1.02 0.70 - 1.30 MG/DL    BUN/Creatinine ratio 9 (L) 12 - 20      GFR est AA >60 >60 ml/min/1.73m2    GFR est non-AA >60 >60 ml/min/1.73m2    Calcium 9.0 8.5 - 10.1 MG/DL    Bilirubin, total 1.0 0.2 - 1.0 MG/DL    ALT (SGPT) 16 12 - 78 U/L    AST (SGOT) 14 (L) 15 - 37 U/L    Alk. phosphatase 146 (H) 45 - 117 U/L    Protein, total 7.0 6.4 - 8.2 g/dL    Albumin 4.0 3.5 - 5.0 g/dL    Globulin 3.0 2.0 - 4.0 g/dL    A-G Ratio 1.3 1.1 - 2.2     CBC W/O DIFF   Result Value Ref Range    WBC 5.6 4.1 - 11.1 K/uL    RBC 4.23 4. 10 - 5.70 M/uL    HGB 12.4 12.1 - 17.0 g/dL    HCT 40.5 36.6 - 50.3 %    MCV 95.7 80.0 - 99.0 FL    MCH 29.3 26.0 - 34.0 PG    MCHC 30.6 30.0 - 36.5 g/dL    RDW 12.6 11.5 - 14.5 %    PLATELET 509 492 - 551 K/uL    MPV 9.6 8.9 - 12.9 FL    NRBC 0.0 0  WBC    ABSOLUTE NRBC 0.00 0.00 - 0.01 K/uL   SAMPLES BEING HELD   Result Value Ref Range    SAMPLES BEING HELD 1SST     COMMENT        Add-on orders for these samples will be processed based on acceptable specimen integrity and analyte stability, which may vary by analyte.        Sincerely,      Sujata Valero MD

## 2022-03-03 ENCOUNTER — OFFICE VISIT (OUTPATIENT)
Dept: NEUROLOGY | Age: 87
End: 2022-03-03
Payer: MEDICARE

## 2022-03-03 VITALS
BODY MASS INDEX: 19.88 KG/M2 | HEIGHT: 73 IN | SYSTOLIC BLOOD PRESSURE: 134 MMHG | WEIGHT: 150 LBS | OXYGEN SATURATION: 94 % | RESPIRATION RATE: 18 BRPM | HEART RATE: 66 BPM | DIASTOLIC BLOOD PRESSURE: 70 MMHG | TEMPERATURE: 99.2 F

## 2022-03-03 DIAGNOSIS — F02.80 LATE ONSET ALZHEIMER'S DISEASE WITHOUT BEHAVIORAL DISTURBANCE (HCC): Primary | ICD-10-CM

## 2022-03-03 DIAGNOSIS — G30.1 LATE ONSET ALZHEIMER'S DISEASE WITHOUT BEHAVIORAL DISTURBANCE (HCC): Primary | ICD-10-CM

## 2022-03-03 DIAGNOSIS — R26.81 GAIT INSTABILITY: ICD-10-CM

## 2022-03-03 PROCEDURE — G8510 SCR DEP NEG, NO PLAN REQD: HCPCS | Performed by: PSYCHIATRY & NEUROLOGY

## 2022-03-03 PROCEDURE — 99214 OFFICE O/P EST MOD 30 MIN: CPT | Performed by: PSYCHIATRY & NEUROLOGY

## 2022-03-03 PROCEDURE — G8420 CALC BMI NORM PARAMETERS: HCPCS | Performed by: PSYCHIATRY & NEUROLOGY

## 2022-03-03 PROCEDURE — G8427 DOCREV CUR MEDS BY ELIG CLIN: HCPCS | Performed by: PSYCHIATRY & NEUROLOGY

## 2022-03-03 PROCEDURE — G8536 NO DOC ELDER MAL SCRN: HCPCS | Performed by: PSYCHIATRY & NEUROLOGY

## 2022-03-03 PROCEDURE — 1101F PT FALLS ASSESS-DOCD LE1/YR: CPT | Performed by: PSYCHIATRY & NEUROLOGY

## 2022-03-03 RX ORDER — MEMANTINE HYDROCHLORIDE 10 MG/1
10 TABLET ORAL 2 TIMES DAILY
Qty: 180 TABLET | Refills: 1 | Status: SHIPPED | OUTPATIENT
Start: 2022-03-03 | End: 2022-09-07 | Stop reason: SDUPTHER

## 2022-03-03 NOTE — PROGRESS NOTES
Identified patient 2 identifiers verified. Chief Complaint   Patient presents with    Follow-up     6 months     1. Have you been to the ER no, urgent care clinic since your last visit?no  Hospitalized since your last visit?no 2. Have you seen or consulted any other health care providers outside of the [address] [place] [address] since your last visit? Yes Cardiology  Include any pap smears or colon screening. no

## 2022-03-03 NOTE — PROGRESS NOTES
Neurology Clinic Follow up Note    Patient ID:  Frank Kulkarni  [id number]  25 y.o.  1935      Mr. Evaristo Oropeza is here for follow up today of dementia     Previous records (physician notes, laboratory reports, and radiology reports) and imaging studies were reviewed and summarized. My recommendations will be communicated back to the patient's physician(s) via electronic medical record and/or by [address] [facility] mail. Last Appointment With Me:  8/18/2021    Interval History:   Still having some difficulty with short term recall. Family reports memory has been stable since last visit. They deny any agitation/behavioral concerns. He needs prompting to eat regularly. He has lost 7 lbs since his last visit. He is taking Namenda as prescribed-he appears to be tolerating this well presently. He denies further falls since last visit. Family has noted some imbalance at times. Ability to function:  Driving: not driving  Finances: handled by his wife now  Cooking: no  Manages own medication: handled by family  Residing: at home with his wife and/or daughter, 24h assistance    PMHx/ PSHx/ FHx/ SHx:  Reviewed and unchanged previous visit. Past Medical History:   Diagnosis Date    Arthritis     LEFT SHOULDER    Dementia ([facility]          ROS:  Comprehensive review of systems negative except for as noted above. Objective:       Meds:  Current Outpatient Medications   Medication Sig Dispense Refill    ergocalciferol (ERGOCALCIFEROL) 1,250 mcg (50,000 unit) capsule Take 1 Capsule by mouth every seven (7) days. 12 Capsule 1    memantine (Namenda) 10 mg tablet Take 1 Tablet by mouth two (2) times a day.  180 Tablet 1         Exam:  Visit Vitals  /70 (BP 1 Location: Left upper arm, BP Patient Position: Sitting, BP Cuff Size: Adult)   Pulse 66   Temp 99.2 °F (37.3 °C) (Oral)   Resp 18   Ht 6' 1\" (1.854 m)   Wt 150 lb (68 kg)   SpO2 94%   BMI 19.79 kg/m²     NEUROLOGICAL EXAM:  R subconjunctival hemorrhage   General: Awake, alert, speech fluent. Disoriented to date/month/year. Difficulty with serial 7's. Impaired delayed recall. CN: PERRL, EOM-slight dysconjugate gaze, VFF to confrontation, facial sensation and strength are normal and symmetric, hearing is intact to finger rub bilaterally, palate and tongue movements are intact and symmetric. Motor: LUE prox strength limited due to shoulder pain, otherwise 5/5. Normal tone, bulk and strength bilaterally. Reflexes: Deferred  Coordination: FNF, GIO, HTS intact. Sensation: LT intact throughout. Gait: Normal-based, slightly unsteady       LABS  Results for orders placed or performed in visit on 01/21/22   MAGNESIUM   Result Value Ref Range    Magnesium 2.3 1.6 - 2.4 mg/dL   HEMOGLOBIN A1C WITH EAG   Result Value Ref Range    Hemoglobin A1c 4.8 4.0 - 5.6 %    Est. average glucose 91 mg/dL   LIPID PANEL   Result Value Ref Range    Cholesterol, total 168 <200 MG/DL    Triglyceride 83 <150 MG/DL    HDL Cholesterol 51 MG/DL    LDL, calculated 100.4 (H) 0 - 100 MG/DL    VLDL, calculated 16.6 MG/DL    CHOL/HDL Ratio 3.3 0.0 - 5.0     TSH 3RD GENERATION   Result Value Ref Range    TSH 2.95 0.36 - 3.81 uIU/mL   METABOLIC PANEL, COMPREHENSIVE   Result Value Ref Range    Sodium 139 136 - 145 mmol/L    Potassium 3.6 3.5 - 5.1 mmol/L    Chloride 106 97 - 108 mmol/L    CO2 27 21 - 32 mmol/L    Anion gap 6 5 - 15 mmol/L    Glucose 129 (H) 65 - 100 mg/dL    BUN 9 6 - 20 MG/DL    Creatinine 1.02 0.70 - 1.30 MG/DL    BUN/Creatinine ratio 9 (L) 12 - 20      GFR est AA >60 >60 ml/min/1.73m2    GFR est non-AA >60 >60 ml/min/1.73m2    Calcium 9.0 8.5 - 10.1 MG/DL    Bilirubin, total 1.0 0.2 - 1.0 MG/DL    ALT (SGPT) 16 12 - 78 U/L    AST (SGOT) 14 (L) 15 - 37 U/L    Alk.  phosphatase 146 (H) 45 - 117 U/L    Protein, total 7.0 6.4 - 8.2 g/dL    Albumin 4.0 3.5 - 5.0 g/dL    Globulin 3.0 2.0 - 4.0 g/dL    A-G Ratio 1.3 1.1 - 2.2     CBC W/O DIFF   Result Value Ref Range    WBC 5.6 4.1 - 11.1 K/uL    RBC 4.23 4.10 - 5.70 M/uL    HGB 12.4 12.1 - 17.0 g/dL    HCT 40.5 36.6 - 50.3 %    MCV 95.7 80.0 - 99.0 FL    MCH 29.3 26.0 - 34.0 PG    MCHC 30.6 30.0 - 36.5 g/dL    RDW 12.6 11.5 - 14.5 %    PLATELET 308 938 - 075 K/uL    MPV 9.6 8.9 - 12.9 FL    NRBC 0.0 0  WBC    ABSOLUTE NRBC 0.00 0.00 - 0.01 K/uL   SAMPLES BEING HELD   Result Value Ref Range    SAMPLES BEING HELD 1SST     COMMENT        Add-on orders for these samples will be processed based on acceptable specimen integrity and analyte stability, which may vary by analyte. Assessment:     Encounter Diagnoses     ICD-10-CM ICD-9-CM   1. Late onset Alzheimer's disease without behavioral disturbance (HCC)  G30.1 331.0    F02.80 294.10   2. Gait instability  R26.81 781. 2     81 y.o. male former  here for f/u of progressive cognitive decline x 7-8 years previously followed at [address] [place] [address]. [address] [place] [address] 16/30 with significantly impaired delayed recall, orientation, language, visuospatial/executive functioning. More mild inattention/naming deficits noted. Examination is otherwise non-focal.  Head CT from 1/2018 reviewed without significant abnormalities. [facility] records reviewed- MRI Brain previously performed and without acute abnormalities. Findings are supportive of a moderate dementia without behavioral disturbance, probable AD. Explained that AD is a progressive disease process. Discussed treatment options in detail including risks/benefits and expectations. While medication is not likely to made a \"night and day\" difference, it may aid modestly in improving concentration and attention. Medication titration and addition of adjunctive agents may be necessary to achieve maximum benefit. Acetylcholinesterase inhibitors and NMDA receptor antagonist may be used to assist with attention and recall. He has tried Aricept in the past with reported side effects/lightheadedness. He appears to be tolerating Namenda without difficulty.    Discussed that he would benefit from designation of a POA to assist with executive decision making. Finances and medication administration should be monitored to ensure accuracy and prevent errors. He should continue to abstain from driving. Discussed biking safety and preference for stationary biking due to difficulties with navigation and visuospatial dysfunction on examination. He has 24h supervision with the assistance of his wife and daughter. Plan:   Continue Namenda 10mg BID  Fall precautions  Encouraged 3 regular meals daily and Ensure supplements up to twice daily   Heart healthy diet and exercise  Regular scheduled cognitive and social engagement. Follow-up and Dispositions    · Return in about 6 months (around 9/3/2022). I have discussed the diagnosis with the patient today and the intended plan as seen in the above orders with both the patient as well as referring provider and/or PCP via electronic correspondence. The patient has received an after-visit summary and questions were answered concerning future plans. I have discussed medication side effects and warnings with the patient as well.           Signed:  Alejandra Ovalle DO  3/3/2022

## 2022-03-18 PROBLEM — E78.49 OTHER HYPERLIPIDEMIA: Status: ACTIVE | Noted: 2019-12-05

## 2022-03-19 PROBLEM — R63.4 WEIGHT LOSS: Status: ACTIVE | Noted: 2018-06-13

## 2022-03-19 PROBLEM — F02.80 ALZHEIMER'S DEMENTIA WITHOUT BEHAVIORAL DISTURBANCE (HCC): Status: ACTIVE | Noted: 2019-12-05

## 2022-03-19 PROBLEM — R49.0 HOARSENESS: Status: ACTIVE | Noted: 2018-06-13

## 2022-03-19 PROBLEM — G30.9 ALZHEIMER'S DEMENTIA WITHOUT BEHAVIORAL DISTURBANCE (HCC): Status: ACTIVE | Noted: 2019-12-05

## 2022-03-19 PROBLEM — R74.8 ELEVATED ALKALINE PHOSPHATASE LEVEL: Status: ACTIVE | Noted: 2018-07-10

## 2022-03-20 PROBLEM — E55.9 VITAMIN D DEFICIENCY: Status: ACTIVE | Noted: 2018-07-12

## 2022-07-26 DIAGNOSIS — E55.9 VITAMIN D DEFICIENCY: ICD-10-CM

## 2022-07-26 RX ORDER — ERGOCALCIFEROL 1.25 MG/1
CAPSULE ORAL
Qty: 12 CAPSULE | Refills: 0 | Status: SHIPPED | OUTPATIENT
Start: 2022-07-26 | End: 2022-08-10 | Stop reason: SDUPTHER

## 2022-08-10 ENCOUNTER — OFFICE VISIT (OUTPATIENT)
Dept: FAMILY MEDICINE CLINIC | Age: 87
End: 2022-08-10
Payer: MEDICARE

## 2022-08-10 VITALS
WEIGHT: 152 LBS | HEART RATE: 74 BPM | TEMPERATURE: 98 F | RESPIRATION RATE: 18 BRPM | SYSTOLIC BLOOD PRESSURE: 133 MMHG | HEIGHT: 73 IN | DIASTOLIC BLOOD PRESSURE: 66 MMHG | OXYGEN SATURATION: 99 % | BODY MASS INDEX: 20.15 KG/M2

## 2022-08-10 DIAGNOSIS — E55.9 VITAMIN D DEFICIENCY: ICD-10-CM

## 2022-08-10 DIAGNOSIS — G30.1 LATE ONSET ALZHEIMER'S DEMENTIA WITHOUT BEHAVIORAL DISTURBANCE (HCC): Primary | ICD-10-CM

## 2022-08-10 DIAGNOSIS — I49.3 FREQUENT PVCS: ICD-10-CM

## 2022-08-10 DIAGNOSIS — F02.80 LATE ONSET ALZHEIMER'S DEMENTIA WITHOUT BEHAVIORAL DISTURBANCE (HCC): Primary | ICD-10-CM

## 2022-08-10 DIAGNOSIS — Z79.899 ENCOUNTER FOR LONG-TERM (CURRENT) USE OF MEDICATIONS: ICD-10-CM

## 2022-08-10 PROCEDURE — G8510 SCR DEP NEG, NO PLAN REQD: HCPCS | Performed by: FAMILY MEDICINE

## 2022-08-10 PROCEDURE — G8536 NO DOC ELDER MAL SCRN: HCPCS | Performed by: FAMILY MEDICINE

## 2022-08-10 PROCEDURE — G0463 HOSPITAL OUTPT CLINIC VISIT: HCPCS | Performed by: FAMILY MEDICINE

## 2022-08-10 PROCEDURE — 1123F ACP DISCUSS/DSCN MKR DOCD: CPT | Performed by: FAMILY MEDICINE

## 2022-08-10 PROCEDURE — G8427 DOCREV CUR MEDS BY ELIG CLIN: HCPCS | Performed by: FAMILY MEDICINE

## 2022-08-10 PROCEDURE — 99213 OFFICE O/P EST LOW 20 MIN: CPT | Performed by: FAMILY MEDICINE

## 2022-08-10 PROCEDURE — G8420 CALC BMI NORM PARAMETERS: HCPCS | Performed by: FAMILY MEDICINE

## 2022-08-10 PROCEDURE — 1101F PT FALLS ASSESS-DOCD LE1/YR: CPT | Performed by: FAMILY MEDICINE

## 2022-08-10 RX ORDER — ERGOCALCIFEROL 1.25 MG/1
50000 CAPSULE ORAL
Qty: 12 CAPSULE | Refills: 1 | Status: SHIPPED | OUTPATIENT
Start: 2022-08-10

## 2022-08-10 NOTE — PROGRESS NOTES
Chief Complaint   Patient presents with    Follow-up     6 mo check       1. Have you been to the ER, urgent care clinic since your last visit? Hospitalized since your last visit? No    2. Have you seen or consulted any other health care providers outside of the [address] [place] [address] since your last visit? Include any pap smears or colon screening.  No

## 2022-08-10 NOTE — PROGRESS NOTES
Janak Mackey is a 81 y.o. male    3rd visit with this physician,   Present with his wife     Vaccinated for COVID       has a past medical history of Arthritis and Dementia ([facility] Dementia getting worse, starting to forget family members  A bit more angry when he's confuse in conversation    Wife was concerned about no appetite  We discussed in record he has gained 4lbs. But we can start remeron if needed. Wife says let's wait and see for now. He report feeling neutral.     Denies LUTS  He denies any concerns, pain or issues. Wife just wanted a routine check up     Previous visit -1/2022  On exam Pulse irregular  Denies syncope, dizziness, light headedness  EKG showed Sinus with frequent ectopic ventricular beat; 20 PVC  ? considering low dose beta blockers - vs referral  Referred to cardio  He did saw the heart doctor and had a monitor over night   Have f/up apt on 8/14/2022     Doesn't want any more colonoscopy      Neuro Dr. Michael Kaur - notes reviewed  Late onset Alzheimer's Dementia  Gait instability  Namenda     Ability to function:  Driving: not driving  Finances: handled by his wife now  Cooking: no  Manages own medication: handled by family  Residing: at home with his wife and/or daughter, 24h assistance      Reviewed: active problem list, medication list, allergies, family history, notes from last encounter, lab results    A comprehensive review of systems was negative except for that written in the HPI. No Known Allergies  Current Outpatient Medications on File Prior to Visit   Medication Sig Dispense Refill    memantine (Namenda) 10 mg tablet Take 1 Tablet by mouth two (2) times a day. 180 Tablet 1     No current facility-administered medications on file prior to visit.      Patient Active Problem List   Diagnosis Code    ORLIN (acute kidney injury) ([facility] N17.9    Abdominal mass R19.00    BPH (benign prostatic hyperplasia) N40.0    Hoarseness R49.0    Weight loss R63.4    Elevated alkaline phosphatase level R74.8    Vitamin D deficiency E55.9    Other hyperlipidemia E78.49    Alzheimer's dementia without behavioral disturbance (HCC) G30.9, F02.80       Visit Vitals  /66   Pulse 74   Temp 98 °F (36.7 °C) (Temporal)   Resp 18   Ht 6' 1\" (1.854 m)   Wt 152 lb (68.9 kg)   SpO2 99%   BMI 20.05 kg/m²     General appearance: alert, cooperative, no distress, appears stated age  Neurologic: Alert and oriented X 3, normal strength and tone, symmetric. Normal without focal findings. Cranial nerves 2-12 intact. Normal coordination and gait. Mental status: Alert, oriented, thought content appropriate, affect: stable, mood-congruent. Head: Normocephalic, without obvious abnormality, atraumatic  Eyes: conjunctivae/corneas clear. PERRL, EOM's intact. Neck: supple, symmetrical, trachea midline, no JVD  Lungs: clear to auscultation bilaterally  Heart: regular rate and rhythm, S1, S2 normal, no murmur, click, rub or gallop  Abdomen: soft, non-tender. Extremities: extremities normal, atraumatic, no cyanosis or edema      Assessment/Plans:    Diagnoses and all orders for this visit:    1. Late onset Alzheimer's dementia without behavioral disturbance (HCC)  -     TSH 3RD GENERATION  -     METABOLIC PANEL, COMPREHENSIVE  -     CBC W/O DIFF    2. Frequent PVCs  -     TSH 3RD GENERATION  -     METABOLIC PANEL, COMPREHENSIVE  -     CBC W/O DIFF    3. Vitamin D deficiency  -     ergocalciferol (ERGOCALCIFEROL) 1,250 mcg (50,000 unit) capsule; Take 1 Capsule by mouth every seven (7) days. 4. Encounter for long-term (current) use of medications  -     TSH 3RD GENERATION  -     METABOLIC PANEL, COMPREHENSIVE  -     CBC W/O DIFF    Discussed plans, risk/benefits of treatments/observations. Through the use of shared decision making, above plans were agreed upon. Medication compliance advised. Patient verbalized understanding.      Follow-up and Dispositions    Return in about 6 months (around 2/10/2023) for medicare wellness.          Omid Beckford MD  8/10/2022

## 2022-08-11 LAB
ALBUMIN SERPL-MCNC: 4.1 G/DL (ref 3.6–4.6)
ALBUMIN/GLOB SERPL: 1.6 {RATIO} (ref 1.2–2.2)
ALP SERPL-CCNC: 140 IU/L (ref 44–121)
ALT SERPL-CCNC: 11 IU/L (ref 0–44)
AST SERPL-CCNC: 16 IU/L (ref 0–40)
BILIRUB SERPL-MCNC: 0.9 MG/DL (ref 0–1.2)
BUN SERPL-MCNC: 10 MG/DL (ref 8–27)
BUN/CREAT SERPL: 11 (ref 10–24)
CALCIUM SERPL-MCNC: 9.5 MG/DL (ref 8.6–10.2)
CHLORIDE SERPL-SCNC: 106 MMOL/L (ref 96–106)
CO2 SERPL-SCNC: 24 MMOL/L (ref 20–29)
CREAT SERPL-MCNC: 0.87 MG/DL (ref 0.76–1.27)
EGFR: 84 ML/MIN/1.73
ERYTHROCYTE [DISTWIDTH] IN BLOOD BY AUTOMATED COUNT: 12.3 % (ref 11.6–15.4)
GLOBULIN SER CALC-MCNC: 2.6 G/DL (ref 1.5–4.5)
GLUCOSE SERPL-MCNC: 95 MG/DL (ref 65–99)
HCT VFR BLD AUTO: 37.7 % (ref 37.5–51)
HGB BLD-MCNC: 12.4 G/DL (ref 13–17.7)
MCH RBC QN AUTO: 30.1 PG (ref 26.6–33)
MCHC RBC AUTO-ENTMCNC: 32.9 G/DL (ref 31.5–35.7)
MCV RBC AUTO: 92 FL (ref 79–97)
PLATELET # BLD AUTO: 225 X10E3/UL (ref 150–450)
POTASSIUM SERPL-SCNC: 4.4 MMOL/L (ref 3.5–5.2)
PROT SERPL-MCNC: 6.7 G/DL (ref 6–8.5)
RBC # BLD AUTO: 4.12 X10E6/UL (ref 4.14–5.8)
SODIUM SERPL-SCNC: 145 MMOL/L (ref 134–144)
TSH SERPL DL<=0.005 MIU/L-ACNC: 1.76 UIU/ML (ref 0.45–4.5)
WBC # BLD AUTO: 5.5 X10E3/UL (ref 3.4–10.8)

## 2022-09-07 ENCOUNTER — OFFICE VISIT (OUTPATIENT)
Dept: NEUROLOGY | Age: 87
End: 2022-09-07
Payer: MEDICARE

## 2022-09-07 ENCOUNTER — TELEPHONE (OUTPATIENT)
Dept: NEUROLOGY | Age: 87
End: 2022-09-07

## 2022-09-07 VITALS
HEIGHT: 75 IN | WEIGHT: 147 LBS | HEART RATE: 87 BPM | BODY MASS INDEX: 18.28 KG/M2 | DIASTOLIC BLOOD PRESSURE: 78 MMHG | SYSTOLIC BLOOD PRESSURE: 122 MMHG | OXYGEN SATURATION: 98 %

## 2022-09-07 DIAGNOSIS — F02.80 LATE ONSET ALZHEIMER'S DISEASE WITHOUT BEHAVIORAL DISTURBANCE (HCC): Primary | ICD-10-CM

## 2022-09-07 DIAGNOSIS — R26.81 GAIT INSTABILITY: ICD-10-CM

## 2022-09-07 DIAGNOSIS — G30.1 LATE ONSET ALZHEIMER'S DISEASE WITHOUT BEHAVIORAL DISTURBANCE (HCC): Primary | ICD-10-CM

## 2022-09-07 PROCEDURE — G8419 CALC BMI OUT NRM PARAM NOF/U: HCPCS | Performed by: PSYCHIATRY & NEUROLOGY

## 2022-09-07 PROCEDURE — 99214 OFFICE O/P EST MOD 30 MIN: CPT | Performed by: PSYCHIATRY & NEUROLOGY

## 2022-09-07 PROCEDURE — 1123F ACP DISCUSS/DSCN MKR DOCD: CPT | Performed by: PSYCHIATRY & NEUROLOGY

## 2022-09-07 PROCEDURE — 1101F PT FALLS ASSESS-DOCD LE1/YR: CPT | Performed by: PSYCHIATRY & NEUROLOGY

## 2022-09-07 PROCEDURE — G8536 NO DOC ELDER MAL SCRN: HCPCS | Performed by: PSYCHIATRY & NEUROLOGY

## 2022-09-07 PROCEDURE — G8427 DOCREV CUR MEDS BY ELIG CLIN: HCPCS | Performed by: PSYCHIATRY & NEUROLOGY

## 2022-09-07 PROCEDURE — G8510 SCR DEP NEG, NO PLAN REQD: HCPCS | Performed by: PSYCHIATRY & NEUROLOGY

## 2022-09-07 RX ORDER — GALANTAMINE 4 MG/1
4 TABLET, FILM COATED ORAL 2 TIMES DAILY
Qty: 180 TABLET | Refills: 1 | Status: SHIPPED | OUTPATIENT
Start: 2022-09-07

## 2022-09-07 RX ORDER — MEMANTINE HYDROCHLORIDE 10 MG/1
10 TABLET ORAL 2 TIMES DAILY
Qty: 180 TABLET | Refills: 1 | Status: SHIPPED | OUTPATIENT
Start: 2022-09-07

## 2022-09-07 RX ORDER — DONEPEZIL HYDROCHLORIDE 5 MG/1
5 TABLET, FILM COATED ORAL
Qty: 90 TABLET | Refills: 1 | Status: SHIPPED | OUTPATIENT
Start: 2022-09-07 | End: 2022-09-07 | Stop reason: SINTOL

## 2022-09-07 NOTE — PROGRESS NOTES
Neurology Clinic Follow up Note    Patient ID:  Carmela Duggan  [id number]  10 y.o.  1935      Mr. Launie Klinefelter is here for follow up today of dementia     Previous records (physician notes, laboratory reports, and radiology reports) and imaging studies were reviewed and summarized. My recommendations will be communicated back to the patient's physician(s) via electronic medical record and/or by [address] mail. Last Appointment With Me:  3/3/2022    Interval History:   Family has noted ongoing decline in memory since last visit. He is having some recent difficulty recalling some more distant family members. They deny any significant agitation/behavioral concerns. He needs prompting to eat regularly. He has lost 3 lbs since his last visit. He is taking Namenda as prescribed-he appears to be tolerating this well presently. He denies further falls since last visit. Family has noted some imbalance at times. Ability to function:  Driving: not driving  Finances: handled by his wife now  Cooking: no  Manages own medication: handled by family  Residing: at home with his wife and/or daughter, 24h assistance    PMHx/ PSHx/ FHx/ SHx:  Reviewed and unchanged previous visit. Past Medical History:   Diagnosis Date    Arthritis     LEFT SHOULDER    Dementia ([facility] [address]          ROS:  Comprehensive review of systems negative except for as noted above. Objective:       Meds:  Current Outpatient Medications   Medication Sig Dispense Refill    ergocalciferol (ERGOCALCIFEROL) 1,250 mcg (50,000 unit) capsule Take 1 Capsule by mouth every seven (7) days. 12 Capsule 1    memantine (Namenda) 10 mg tablet Take 1 Tablet by mouth two (2) times a day. 180 Tablet 1         Exam:  Visit Vitals  /78   Pulse 87   Ht 6' 3\" (1.905 m)   Wt 147 lb (66.7 kg)   SpO2 98%   BMI 18.37 kg/m²     NEUROLOGICAL EXAM:  R subconjunctival hemorrhage   General: Awake, alert, speech fluent. Disoriented to date/month/year knows place/self.  Difficulty with serial 7's. Impaired delayed recall. CN: PERRL, EOM-slight dysconjugate gaze, VFF to confrontation, facial sensation and strength are normal and symmetric, hearing is intact to finger rub bilaterally, palate and tongue movements are intact and symmetric. Motor: LUE prox strength limited due to shoulder pain, otherwise 5/5. Normal tone, bulk and strength bilaterally. Reflexes: Deferred  Coordination: FNF, GIO, HTS intact. Sensation: LT intact throughout. Gait: Normal-based, slightly unsteady       LABS  Results for orders placed or performed in visit on 08/10/22   TSH 3RD GENERATION   Result Value Ref Range    TSH 1.760 0.450 - 2.952 uIU/mL   METABOLIC PANEL, COMPREHENSIVE   Result Value Ref Range    Glucose 95 65 - 99 mg/dL    BUN 10 8 - 27 mg/dL    Creatinine 0.87 0.76 - 1.27 mg/dL    eGFR 84 >59 mL/min/1.73    BUN/Creatinine ratio 11 10 - 24    Sodium 145 (H) 134 - 144 mmol/L    Potassium 4.4 3.5 - 5.2 mmol/L    Chloride 106 96 - 106 mmol/L    CO2 24 20 - 29 mmol/L    Calcium 9.5 8.6 - 10.2 mg/dL    Protein, total 6.7 6.0 - 8.5 g/dL    Albumin 4.1 3.6 - 4.6 g/dL    GLOBULIN, TOTAL 2.6 1.5 - 4.5 g/dL    A-G Ratio 1.6 1.2 - 2.2    Bilirubin, total 0.9 0.0 - 1.2 mg/dL    Alk. phosphatase 140 (H) 44 - 121 IU/L    AST (SGOT) 16 0 - 40 IU/L    ALT (SGPT) 11 0 - 44 IU/L   CBC W/O DIFF   Result Value Ref Range    WBC 5.5 3.4 - 10.8 x10E3/uL    RBC 4.12 (L) 4.14 - 5.80 x10E6/uL    HGB 12.4 (L) 13.0 - 17.7 g/dL    HCT 37.7 37.5 - 51.0 %    MCV 92 79 - 97 fL    MCH 30.1 26.6 - 33.0 pg    MCHC 32.9 31.5 - 35.7 g/dL    RDW 12.3 11.6 - 15.4 %    PLATELET 457 857 - 299 x10E3/uL       Assessment:     Encounter Diagnoses     ICD-10-CM ICD-9-CM   1. Late onset Alzheimer's disease without behavioral disturbance (HCC)  G30.1 331.0    F02.80 294.10   2. Gait instability  R26.81 781.2     81 y.o. male former  here for f/u of progressive cognitive decline x 7-8 years previously followed at [address] [place] [address].  [address] [place] [address] 16/30 with significantly impaired delayed recall, orientation, language, visuospatial/executive functioning. More mild inattention/naming deficits noted. Examination is otherwise non-focal.  Head CT from 1/2018 reviewed without significant abnormalities. [facility] records reviewed- MRI Brain previously performed and without acute abnormalities. Findings are supportive of a moderate dementia without behavioral disturbance, probable AD. Explained that AD is a progressive disease process. Discussed treatment options in detail including risks/benefits and expectations. While medication is not likely to made a \"night and day\" difference, it may aid modestly in improving concentration and attention. Medication titration and addition of adjunctive agents may be necessary to achieve maximum benefit. Acetylcholinesterase inhibitors and NMDA receptor antagonist may be used to assist with attention and recall. He has tried Aricept in the past with reported side effects/lightheadedness. He appears to be tolerating Namenda well. Discussed initiation of galantamine as adjunctive therapy for progressive cognitive decline. Discussed that he would benefit from designation of a POA to assist with executive decision making. Finances and medication administration should be monitored to ensure accuracy and prevent errors. He should continue to abstain from driving. Discussed biking safety and preference for stationary biking due to difficulties with navigation and visuospatial dysfunction on examination. He has 24h supervision with the assistance of his wife and daughter. Plan:   Start galantamine 4mg BID and continue Namenda 10mg BID  Fall precautions  Encouraged 3 regular meals daily and Ensure supplements up to twice daily   Heart healthy diet and exercise  Regular scheduled cognitive and social engagement. Follow-up and Dispositions    Return in about 6 months (around 3/7/2023).          I have discussed the diagnosis with the patient today and the intended plan as seen in the above orders with both the patient as well as referring provider and/or PCP via electronic correspondence. The patient has received an after-visit summary and questions were answered concerning future plans. I have discussed medication side effects and warnings with the patient as well.           Signed:  Cahndra Jaquez DO  9/7/2022

## 2023-05-17 RX ORDER — ERGOCALCIFEROL 1.25 MG/1
50000 CAPSULE ORAL
Qty: 4 CAPSULE | Refills: 0 | Status: SHIPPED | OUTPATIENT
Start: 2023-05-17

## 2023-05-17 NOTE — TELEPHONE ENCOUNTER
Last appointment: 8/10/22  Next appointment: [facility] [place] to follow-up 2/2023  Previous refill encounter(s): 8/10/22 #12 with 1 refill    Requested Prescriptions     Pending Prescriptions Disp Refills    vitamin D (ERGOCALCIFEROL) 1.25 MG (89658 UT) CAPS capsule [Pharmacy Med Name: VITAMIN D2 50,000IU (ERGO) CAP RX] 4 capsule 0     Sig: Take 1 capsule by mouth every 7 days Patient needs an appointment for further refills         For Pharmacy Admin Tracking Only    Program: Medication Refill  CPA in place:    Recommendation Provided To:    Intervention Detail: New Rx: 1, reason: Patient Preference  Intervention Accepted By:   Alejandro Johnson Closed?:    Time Spent (min): 5

## 2023-05-18 RX ORDER — ERGOCALCIFEROL 1.25 MG/1
50000 CAPSULE ORAL
COMMUNITY
Start: 2022-08-10

## 2023-05-18 RX ORDER — GALANTAMINE HYDROBROMIDE 4 MG/1
4 TABLET, FILM COATED ORAL 2 TIMES DAILY
COMMUNITY
Start: 2022-09-07 | End: 2023-07-19

## 2023-05-18 RX ORDER — MEMANTINE HYDROCHLORIDE 10 MG/1
10 TABLET ORAL 2 TIMES DAILY
COMMUNITY
Start: 2022-09-07 | End: 2023-07-19

## 2023-07-19 ENCOUNTER — OFFICE VISIT (OUTPATIENT)
Age: 88
End: 2023-07-19
Payer: MEDICARE

## 2023-07-19 VITALS
OXYGEN SATURATION: 98 % | HEIGHT: 74 IN | SYSTOLIC BLOOD PRESSURE: 160 MMHG | BODY MASS INDEX: 18.99 KG/M2 | HEART RATE: 105 BPM | DIASTOLIC BLOOD PRESSURE: 88 MMHG | WEIGHT: 148 LBS

## 2023-07-19 DIAGNOSIS — R26.81 GAIT INSTABILITY: ICD-10-CM

## 2023-07-19 DIAGNOSIS — G30.1 ALZHEIMER'S DISEASE WITH LATE ONSET (CODE) (HCC): Primary | ICD-10-CM

## 2023-07-19 PROCEDURE — 99214 OFFICE O/P EST MOD 30 MIN: CPT | Performed by: PSYCHIATRY & NEUROLOGY

## 2023-07-19 PROCEDURE — 1123F ACP DISCUSS/DSCN MKR DOCD: CPT | Performed by: PSYCHIATRY & NEUROLOGY

## 2023-07-19 PROCEDURE — 1036F TOBACCO NON-USER: CPT | Performed by: PSYCHIATRY & NEUROLOGY

## 2023-07-19 PROCEDURE — G8420 CALC BMI NORM PARAMETERS: HCPCS | Performed by: PSYCHIATRY & NEUROLOGY

## 2023-07-19 PROCEDURE — G8427 DOCREV CUR MEDS BY ELIG CLIN: HCPCS | Performed by: PSYCHIATRY & NEUROLOGY

## 2023-07-19 RX ORDER — GALANTAMINE HYDROBROMIDE 4 MG/1
4 TABLET, FILM COATED ORAL 2 TIMES DAILY
Qty: 180 TABLET | Refills: 1 | Status: SHIPPED | OUTPATIENT
Start: 2023-07-19

## 2023-07-19 RX ORDER — MEMANTINE HYDROCHLORIDE 10 MG/1
10 TABLET ORAL 2 TIMES DAILY
Qty: 180 TABLET | Refills: 1 | Status: SHIPPED | OUTPATIENT
Start: 2023-07-19

## 2023-07-19 NOTE — PROGRESS NOTES
inattention/naming deficits noted. Repeat MOCA is 12/30 today. Head CT from 1/2018 reviewed without significant abnormalities. [facility] records reviewed- MRI Brain previously performed and without acute abnormalities. Findings are supportive of a severe dementia without behavioral disturbance, suspect Alzheimer's subtype. Explained that AD is a progressive disease process. Discussed treatment options in detail including risks/benefits and expectations. While medication is not likely to made a \"night and day\" difference, it may aid modestly in improving concentration and attention. Medication titration and addition of adjunctive agents may be necessary to achieve maximum benefit. Acetylcholinesterase inhibitors and NMDA receptor antagonist may be used to assist with attention and recall. He has tried Aricept in the past with reported side effects/lightheadedness. Will resume galantamine and memantine to assist with cognitive deficits. Discussed that he would benefit from designation of a POA to assist with executive decision making. Finances and medication administration should be monitored to ensure accuracy and prevent errors. He should continue to abstain from driving. He has 24h supervision with the assistance of his wife and daughter. Plan:   Resume galantamine 4mg BID and Namenda 10mg BID  Fall precautions discussed  Heart healthy diet and exercise  Regular scheduled cognitive and social engagement. Return in about 6 months (around 1/19/2024). I have discussed the diagnosis with the patient today and the intended plan as seen in the above orders with both the patient as well as referring provider and/or PCP via electronic correspondence. The patient has received an after-visit summary and questions were answered concerning future plans. I have discussed medication side effects and warnings with the patient as well.       Signed:  Jess Murry DO  7/19/23

## 2023-08-16 RX ORDER — ERGOCALCIFEROL 1.25 MG/1
50000 CAPSULE ORAL
Qty: 12 CAPSULE | Refills: 1 | Status: SHIPPED | OUTPATIENT
Start: 2023-08-16

## 2023-08-16 NOTE — TELEPHONE ENCOUNTER
Last appointment: 8/10/22  Next appointment: [place] to follow-up 2/10/23  Previous refill encounter(s): 8/10/22 #12 with 1 refill    Requested Prescriptions     Pending Prescriptions Disp Refills    vitamin D (ERGOCALCIFEROL) 1.25 MG (22185 UT) CAPS capsule [Pharmacy Med Name: VITAMIN D2 50,000IU (ERGO) CAP RX] 4 capsule 0     Sig: Take 1 capsule by mouth every 7 days Patient needs an appointment for further refills         For Pharmacy Admin Tracking Only    Program: Medication Refill  CPA in place:    Recommendation Provided To:    Intervention Detail: New Rx: 1, reason: Patient Preference  Intervention Accepted By:   Regina Bee Closed?:    Time Spent (min): 5

## 2024-01-22 ENCOUNTER — OFFICE VISIT (OUTPATIENT)
Age: 89
End: 2024-01-22
Payer: MEDICARE

## 2024-01-22 VITALS
SYSTOLIC BLOOD PRESSURE: 174 MMHG | HEIGHT: 76 IN | OXYGEN SATURATION: 98 % | BODY MASS INDEX: 17.54 KG/M2 | DIASTOLIC BLOOD PRESSURE: 80 MMHG | HEART RATE: 82 BPM | WEIGHT: 144 LBS

## 2024-01-22 DIAGNOSIS — R26.81 GAIT INSTABILITY: ICD-10-CM

## 2024-01-22 DIAGNOSIS — G30.1 ALZHEIMER'S DISEASE WITH LATE ONSET (CODE) (HCC): Primary | ICD-10-CM

## 2024-01-22 PROCEDURE — G8419 CALC BMI OUT NRM PARAM NOF/U: HCPCS | Performed by: PSYCHIATRY & NEUROLOGY

## 2024-01-22 PROCEDURE — G8427 DOCREV CUR MEDS BY ELIG CLIN: HCPCS | Performed by: PSYCHIATRY & NEUROLOGY

## 2024-01-22 PROCEDURE — 99214 OFFICE O/P EST MOD 30 MIN: CPT | Performed by: PSYCHIATRY & NEUROLOGY

## 2024-01-22 PROCEDURE — 1123F ACP DISCUSS/DSCN MKR DOCD: CPT | Performed by: PSYCHIATRY & NEUROLOGY

## 2024-01-22 PROCEDURE — 1036F TOBACCO NON-USER: CPT | Performed by: PSYCHIATRY & NEUROLOGY

## 2024-01-22 PROCEDURE — G8484 FLU IMMUNIZE NO ADMIN: HCPCS | Performed by: PSYCHIATRY & NEUROLOGY

## 2024-01-22 RX ORDER — GALANTAMINE HYDROBROMIDE 4 MG/1
4 TABLET, FILM COATED ORAL 2 TIMES DAILY
Qty: 180 TABLET | Refills: 1 | Status: SHIPPED | OUTPATIENT
Start: 2024-01-22

## 2024-01-22 RX ORDER — MEMANTINE HYDROCHLORIDE 10 MG/1
10 TABLET ORAL 2 TIMES DAILY
Qty: 180 TABLET | Refills: 1 | Status: SHIPPED | OUTPATIENT
Start: 2024-01-22

## 2024-01-22 NOTE — PROGRESS NOTES
Neurology Clinic Follow up Note    Patient ID:  Pedro Gates  [id number]  1935      Mr. Gates is here for follow up today of dementia     Previous records (physician notes, laboratory reports, and radiology reports) and imaging studies were reviewed and summarized. My recommendations will be communicated back to the patient's physician(s) via electronic medical record and/or by [place] mail.     Last Appointment With Me:  7/19/2023    Interval History:   Family reports that he is somewhat more impatient when in public with mild agitation. Denies hallucinations.   Wife is assisting with medications, meal prep, finances. He is independent for dressing, bathing, toileting.   No further falls since last visit. No significant weight loss reported.   Taking galantamine and memantine for cognitive deficits. No side effects.     Ability to function:  Driving: not driving  Finances: handled by his wife now  Cooking: no  Manages own medication: handled by family  Residing: at home with his wife and/or daughter, 24h assistance    PMHx/ PSHx/ FHx/ SHx:  Reviewed and unchanged previous visit.   Past Medical History:   Diagnosis Date    Arthritis     LEFT SHOULDER    Dementia (HCC)        ROS:  Comprehensive review of systems negative except for as noted above.       Objective:       Meds:  Current Outpatient Medications   Medication Sig Dispense Refill    galantamine (RAZADYNE) 4 MG tablet Take 1 tablet by mouth 2 times daily 180 tablet 1    memantine (NAMENDA) 10 MG tablet Take 1 tablet by mouth 2 times daily 180 tablet 1    ergocalciferol (ERGOCALCIFEROL) 1.25 MG (02469 UT) capsule Take 1 capsule by mouth every 7 days       No current facility-administered medications for this visit.       Exam:  Visit Vitals  BP (!) 174/80   Pulse 82   Ht 1.918 m (6' 3.5\")   Wt 65.3 kg (144 lb)   SpO2 98%   BMI 17.76 kg/m²   NEUROLOGICAL EXAM:  General: Awake, alert, speech fluent. Disoriented to date/month/year, knows self and place.

## 2024-05-08 RX ORDER — ERGOCALCIFEROL 1.25 MG/1
50000 CAPSULE ORAL
Qty: 12 CAPSULE | OUTPATIENT
Start: 2024-05-08

## 2024-05-08 NOTE — TELEPHONE ENCOUNTER
Patient not seen since 2022 - needs appt    For Pharmacy Admin Tracking Only    Program: Medication Refill  CPA in place:    Recommendation Provided To:   Intervention Detail: New Rx: 1, reason: Patient Preference and Scheduled Appointment  Intervention Accepted By:   Gap Closed?:    Time Spent (min): 5

## 2024-08-15 ENCOUNTER — OFFICE VISIT (OUTPATIENT)
Age: 89
End: 2024-08-15
Payer: MEDICARE

## 2024-08-15 VITALS
SYSTOLIC BLOOD PRESSURE: 162 MMHG | HEART RATE: 84 BPM | WEIGHT: 150 LBS | OXYGEN SATURATION: 96 % | BODY MASS INDEX: 18.5 KG/M2 | DIASTOLIC BLOOD PRESSURE: 90 MMHG

## 2024-08-15 DIAGNOSIS — R26.81 GAIT INSTABILITY: ICD-10-CM

## 2024-08-15 DIAGNOSIS — G30.1 ALZHEIMER'S DISEASE WITH LATE ONSET (CODE) (HCC): Primary | ICD-10-CM

## 2024-08-15 PROCEDURE — 1036F TOBACCO NON-USER: CPT | Performed by: PSYCHIATRY & NEUROLOGY

## 2024-08-15 PROCEDURE — 99214 OFFICE O/P EST MOD 30 MIN: CPT | Performed by: PSYCHIATRY & NEUROLOGY

## 2024-08-15 PROCEDURE — 1123F ACP DISCUSS/DSCN MKR DOCD: CPT | Performed by: PSYCHIATRY & NEUROLOGY

## 2024-08-15 PROCEDURE — G8420 CALC BMI NORM PARAMETERS: HCPCS | Performed by: PSYCHIATRY & NEUROLOGY

## 2024-08-15 PROCEDURE — G8427 DOCREV CUR MEDS BY ELIG CLIN: HCPCS | Performed by: PSYCHIATRY & NEUROLOGY

## 2024-08-15 RX ORDER — ACETAMINOPHEN 160 MG
TABLET,DISINTEGRATING ORAL DAILY
COMMUNITY

## 2024-08-15 RX ORDER — GALANTAMINE HYDROBROMIDE 4 MG/1
4 TABLET, FILM COATED ORAL 2 TIMES DAILY
Qty: 180 TABLET | Refills: 1 | Status: SHIPPED | OUTPATIENT
Start: 2024-08-15

## 2024-08-15 RX ORDER — MEMANTINE HYDROCHLORIDE 10 MG/1
10 TABLET ORAL 2 TIMES DAILY
Qty: 180 TABLET | Refills: 1 | Status: SHIPPED | OUTPATIENT
Start: 2024-08-15

## 2024-08-15 NOTE — PROGRESS NOTES
without significant abnormalities.  [facility] records reviewed- MRI Brain previously performed and without acute abnormalities.  Findings are supportive of a severe dementia without behavioral disturbance, suspect Alzheimer's subtype.  Explained that AD is a progressive disease process.  Discussed treatment options in detail including risks/benefits and expectations.  While medication is not likely to made a \"night and day\" difference, it may aid modestly in improving concentration and attention.  Medication titration and addition of adjunctive agents may be necessary to achieve maximum benefit. Acetylcholinesterase inhibitors and NMDA receptor antagonist may be used to assist with attention and recall. He has tried Aricept in the past with reported side effects/lightheadedness. Will continue galantamine and memantine to assist with cognitive deficits.  Discussed that he would benefit from designation of a POA to assist with executive decision making.  Finances and medication administration should be monitored to ensure accuracy and prevent errors.  He should continue to abstain from driving.  He has 24h supervision with the assistance of his wife and daughter.    Plan:   Continue galantamine 4mg BID and Namenda 10mg BID  Fall precautions discussed  Heart healthy diet and exercise  Regular scheduled cognitive and social engagement.    Return in about 6 months (around 2/15/2025).    I have discussed the diagnosis with the patient today and the intended plan as seen in the above orders with both the patient as well as referring provider and/or PCP via electronic correspondence. The patient has received an after-visit summary and questions were answered concerning future plans. I have discussed medication side effects and warnings with the patient as well.      Signed:  Tess Cardenas,   8/15/24

## 2025-04-14 ENCOUNTER — OFFICE VISIT (OUTPATIENT)
Age: 89
End: 2025-04-14
Payer: MEDICARE

## 2025-04-14 VITALS
RESPIRATION RATE: 17 BRPM | HEIGHT: 73 IN | BODY MASS INDEX: 19.88 KG/M2 | OXYGEN SATURATION: 98 % | HEART RATE: 49 BPM | SYSTOLIC BLOOD PRESSURE: 134 MMHG | WEIGHT: 150 LBS | DIASTOLIC BLOOD PRESSURE: 84 MMHG

## 2025-04-14 DIAGNOSIS — R26.81 GAIT INSTABILITY: ICD-10-CM

## 2025-04-14 DIAGNOSIS — G30.1 ALZHEIMER'S DISEASE WITH LATE ONSET (CODE): Primary | ICD-10-CM

## 2025-04-14 PROCEDURE — 99215 OFFICE O/P EST HI 40 MIN: CPT

## 2025-04-14 PROCEDURE — 1036F TOBACCO NON-USER: CPT

## 2025-04-14 PROCEDURE — G8420 CALC BMI NORM PARAMETERS: HCPCS

## 2025-04-14 PROCEDURE — 1123F ACP DISCUSS/DSCN MKR DOCD: CPT

## 2025-04-14 PROCEDURE — 1160F RVW MEDS BY RX/DR IN RCRD: CPT

## 2025-04-14 PROCEDURE — 1126F AMNT PAIN NOTED NONE PRSNT: CPT

## 2025-04-14 PROCEDURE — G8427 DOCREV CUR MEDS BY ELIG CLIN: HCPCS

## 2025-04-14 PROCEDURE — 1159F MED LIST DOCD IN RCRD: CPT

## 2025-04-14 RX ORDER — METOPROLOL SUCCINATE 50 MG/1
50 TABLET, EXTENDED RELEASE ORAL DAILY
COMMUNITY
Start: 2025-04-10

## 2025-04-14 RX ORDER — MEMANTINE HYDROCHLORIDE 10 MG/1
10 TABLET ORAL 2 TIMES DAILY
Qty: 180 TABLET | Refills: 1 | Status: SHIPPED | OUTPATIENT
Start: 2025-04-14

## 2025-04-14 RX ORDER — GALANTAMINE 4 MG/1
4 TABLET, FILM COATED ORAL 2 TIMES DAILY
Qty: 180 TABLET | Refills: 1 | Status: SHIPPED | OUTPATIENT
Start: 2025-04-14

## 2025-04-14 ASSESSMENT — PATIENT HEALTH QUESTIONNAIRE - PHQ9
SUM OF ALL RESPONSES TO PHQ QUESTIONS 1-9: 0
2. FEELING DOWN, DEPRESSED OR HOPELESS: NOT AT ALL
SUM OF ALL RESPONSES TO PHQ QUESTIONS 1-9: 0
1. LITTLE INTEREST OR PLEASURE IN DOING THINGS: NOT AT ALL

## 2025-04-14 ASSESSMENT — VISUAL ACUITY: VA_NORMAL: 1

## 2025-04-14 ASSESSMENT — ENCOUNTER SYMPTOMS: PHOTOPHOBIA: 1

## 2025-04-14 NOTE — PROGRESS NOTES
Chief Complaint   Patient presents with    Follow[facility]      Vitals:    04/14/25 1422   BP: 134/84   Pulse: (!) 49   Resp: 17   SpO2: 98%

## 2025-04-14 NOTE — PROGRESS NOTES
Chief Complaint   Patient presents with    Follow-up     [facility]       HPI    Mr Gates is 89 year old male here for FU. He is new to me this visit. Last seen by Dr Cardenas on 8/15/24 for severe dementia, likely AD, with gait instability. Last MOCA 12/30. He is on Galantamine 4 mg BID and Namenda 10 mg BID. He is here today with his wife.   She is reporting that he is Slower than his last visit, had a fall in March, did not get hurt  Memory is slower  Eating is slowing down, drinking lots of soda, not a lot of water. Wife makes all the meals, he will not eat if she doesn't make anything for him.  Sleeping good, will get up to use the bathroom  No wandering, or safety concerns. Is ok alone for short periods of time  Sedentary lifestyle. Not a lot of activity or stimulation.  Mood is good. Gets frustrated with directions.  Some hallucinations about things. Nothing scary. \"Waiting for his friends\"  Sometimes yell out in his sleep  Hard time with changing his clothes, he will sleep in his clothes. Hard getting him in the shower. Wife lets him be, but he wont wash up on his own. He does use the bathroom on his own.   Tolerating his medications. Wife handles all that.           Background  89 y.o. male former  here for f/u of progressive cognitive decline x 8 years previously followed at [facility]. Prior MOCA 16/30 with significantly impaired delayed recall, orientation, language, visuospatial/executive functioning. More mild inattention/naming deficits noted.  Repeat MOCA 12/30. Head CT from 1/2018 reviewed without significant abnormalities.  [facility] records reviewed- MRI Brain previously performed and without acute abnormalities.  Findings are supportive of a severe dementia without behavioral disturbance, suspect Alzheimer's subtype.  Explained that AD is a progressive disease process.  Discussed treatment options in detail including risks/benefits and expectations.  While medication is not likely to made a

## (undated) DEVICE — INFECTION CONTROL KIT SYS

## (undated) DEVICE — NDL PRT INJ NSAF BLNT 18GX1.5 --

## (undated) DEVICE — SOLUTION IV 1000ML 0.9% SOD CHL

## (undated) DEVICE — KENDALL SCD EXPRESS SLEEVES, KNEE LENGTH, MEDIUM: Brand: KENDALL SCD

## (undated) DEVICE — CODMAN® SURGICAL PATTIES 1/2" X 3" (1.27CM X 7.62CM): Brand: CODMAN®

## (undated) DEVICE — BASIC SINGLE BASIN BTC-LF: Brand: MEDLINE INDUSTRIES, INC.

## (undated) DEVICE — SKIN MARKER,REGULAR TIP WITH RULER AND LABELS: Brand: DEVON

## (undated) DEVICE — X-RAY SPONGES,16 PLY: Brand: DERMACEA

## (undated) DEVICE — GUARD TEETH AD NYL FOR LARYNSCP POS PROTCT

## (undated) DEVICE — CODMAN® SURGICAL PATTIES 1/2" X 1/2" (1.27CM X 1.27CM): Brand: CODMAN®

## (undated) DEVICE — MEDI-VAC NON-CONDUCTIVE SUCTION TUBING: Brand: CARDINAL HEALTH

## (undated) DEVICE — CODMAN® SURGICAL PATTIES 3/4" X 3/4" (1.91CM X 1.91CM): Brand: CODMAN®

## (undated) DEVICE — EYE PADSSTERILENOT MADE WITH NATURAL RUBBER LATEXSINGLE USE ONLYDO NOT USE IF PACKAGE OPENED OR DAMAGED: Brand: CARDINAL HEALTH

## (undated) DEVICE — TELFA NON-ADHERENT ABSORBENT DRESSING: Brand: TELFA

## (undated) DEVICE — DRAPE,REIN 53X77,STERILE: Brand: MEDLINE

## (undated) DEVICE — DEVON™ KNEE AND BODY STRAP 60" X 3" (1.5 M X 7.6 CM): Brand: DEVON

## (undated) DEVICE — BULB SYRINGE, IRRIGATION WITH PROTECTIVE CAP, 60 CC, INDIVIDUALLY WRAPPED: Brand: DOVER

## (undated) DEVICE — TOWEL SURG W17XL27IN STD BLU COT NONFENESTRATED PREWASHED

## (undated) DEVICE — 40418 TRENDELENBURG ONE-STEP ARM PROTECTORS LARGE (1 PAIR): Brand: 40418 TRENDELENBURG ONE-STEP ARM PROTECTORS LARGE (1 PAIR)

## (undated) DEVICE — GRADUATED BOWL: Brand: DEVON